# Patient Record
Sex: FEMALE | Race: WHITE
[De-identification: names, ages, dates, MRNs, and addresses within clinical notes are randomized per-mention and may not be internally consistent; named-entity substitution may affect disease eponyms.]

---

## 2017-01-17 ENCOUNTER — ROUTINE PRENATAL (OUTPATIENT)
Dept: OBGYN | Facility: CLINIC | Age: 27
End: 2017-01-17

## 2017-01-17 VITALS
WEIGHT: 153 LBS | HEART RATE: 90 BPM | BODY MASS INDEX: 27.98 KG/M2 | SYSTOLIC BLOOD PRESSURE: 127 MMHG | DIASTOLIC BLOOD PRESSURE: 72 MMHG

## 2017-01-17 DIAGNOSIS — Z23 NEED FOR PROPHYLACTIC VACCINATION WITH DIPHTHERIA-TETANUS-PERTUSSIS WITH POLIOMYELITIS (DTP + POLIO) VACCINE: ICD-10-CM

## 2017-01-17 DIAGNOSIS — Z34.83 PRENATAL CARE, SUBSEQUENT PREGNANCY, THIRD TRIMESTER: Primary | ICD-10-CM

## 2017-01-17 DIAGNOSIS — Z3A.27 27 WEEKS GESTATION OF PREGNANCY: ICD-10-CM

## 2017-01-17 PROCEDURE — 0502F SUBSEQUENT PRENATAL CARE: CPT | Performed by: SPECIALIST

## 2017-01-17 PROCEDURE — 90471 IMMUNIZATION ADMIN: CPT | Performed by: SPECIALIST

## 2017-01-17 PROCEDURE — 90715 TDAP VACCINE 7 YRS/> IM: CPT | Performed by: SPECIALIST

## 2017-01-31 ENCOUNTER — ROUTINE PRENATAL (OUTPATIENT)
Dept: OBGYN | Facility: CLINIC | Age: 27
End: 2017-01-31

## 2017-01-31 VITALS
SYSTOLIC BLOOD PRESSURE: 120 MMHG | WEIGHT: 149 LBS | BODY MASS INDEX: 27.25 KG/M2 | HEART RATE: 91 BPM | DIASTOLIC BLOOD PRESSURE: 76 MMHG

## 2017-01-31 DIAGNOSIS — Z3A.29 29 WEEKS GESTATION OF PREGNANCY: Primary | ICD-10-CM

## 2017-01-31 PROCEDURE — 0502F SUBSEQUENT PRENATAL CARE: CPT | Performed by: SPECIALIST

## 2017-02-21 ENCOUNTER — ROUTINE PRENATAL (OUTPATIENT)
Dept: OBGYN | Facility: CLINIC | Age: 27
End: 2017-02-21

## 2017-02-21 VITALS
WEIGHT: 151 LBS | DIASTOLIC BLOOD PRESSURE: 72 MMHG | BODY MASS INDEX: 27.62 KG/M2 | SYSTOLIC BLOOD PRESSURE: 122 MMHG | HEART RATE: 88 BPM

## 2017-02-21 DIAGNOSIS — Z34.83 PRENATAL CARE, SUBSEQUENT PREGNANCY, THIRD TRIMESTER: ICD-10-CM

## 2017-02-21 DIAGNOSIS — Z3A.32 32 WEEKS GESTATION OF PREGNANCY: Primary | ICD-10-CM

## 2017-02-21 PROCEDURE — 0502F SUBSEQUENT PRENATAL CARE: CPT | Performed by: SPECIALIST

## 2017-03-07 ENCOUNTER — ROUTINE PRENATAL (OUTPATIENT)
Dept: OBGYN | Facility: CLINIC | Age: 27
End: 2017-03-07

## 2017-03-07 VITALS
HEART RATE: 91 BPM | BODY MASS INDEX: 28.35 KG/M2 | DIASTOLIC BLOOD PRESSURE: 75 MMHG | WEIGHT: 155 LBS | SYSTOLIC BLOOD PRESSURE: 122 MMHG

## 2017-03-07 DIAGNOSIS — Z3A.35 35 WEEKS GESTATION OF PREGNANCY: ICD-10-CM

## 2017-03-07 DIAGNOSIS — Z34.83 PRENATAL CARE, SUBSEQUENT PREGNANCY, THIRD TRIMESTER: Primary | ICD-10-CM

## 2017-03-07 PROCEDURE — 0502F SUBSEQUENT PRENATAL CARE: CPT | Performed by: SPECIALIST

## 2017-03-14 ENCOUNTER — ROUTINE PRENATAL (OUTPATIENT)
Dept: OBGYN CLINIC | Age: 27
End: 2017-03-14

## 2017-03-14 VITALS
BODY MASS INDEX: 28.53 KG/M2 | DIASTOLIC BLOOD PRESSURE: 79 MMHG | SYSTOLIC BLOOD PRESSURE: 133 MMHG | HEART RATE: 99 BPM | WEIGHT: 156 LBS

## 2017-03-14 DIAGNOSIS — Z3A.36 36 WEEKS GESTATION OF PREGNANCY: ICD-10-CM

## 2017-03-14 DIAGNOSIS — Z34.83 PRENATAL CARE, SUBSEQUENT PREGNANCY, THIRD TRIMESTER: Primary | ICD-10-CM

## 2017-03-14 PROCEDURE — 0502F SUBSEQUENT PRENATAL CARE: CPT | Performed by: SPECIALIST

## 2017-03-20 ENCOUNTER — PROCEDURE VISIT (OUTPATIENT)
Dept: OBGYN CLINIC | Age: 27
End: 2017-03-20

## 2017-03-20 DIAGNOSIS — Z34.83 PRENATAL CARE, SUBSEQUENT PREGNANCY, THIRD TRIMESTER: ICD-10-CM

## 2017-03-20 DIAGNOSIS — Z3A.36 36 WEEKS GESTATION OF PREGNANCY: ICD-10-CM

## 2017-03-20 LAB
ABDOMINAL CIRCUMFERENCE: NORMAL CM
BIPARIETAL DIAMETER: NORMAL CM
ESTIMATED FETAL WEIGHT: NORMAL GRAMS
FEMORAL DIAMETER: NORMAL CM
FEMORAL LENGTH: NORMAL CM
HC/AC: NORMAL
HEAD CIRCUMFERENCE: NORMAL CM

## 2017-03-21 ENCOUNTER — ROUTINE PRENATAL (OUTPATIENT)
Dept: OBGYN CLINIC | Age: 27
End: 2017-03-21
Payer: COMMERCIAL

## 2017-03-21 ENCOUNTER — HOSPITAL ENCOUNTER (OUTPATIENT)
Age: 27
Setting detail: SPECIMEN
Discharge: HOME | End: 2017-03-21
Payer: COMMERCIAL

## 2017-03-21 VITALS
BODY MASS INDEX: 28.53 KG/M2 | WEIGHT: 156 LBS | SYSTOLIC BLOOD PRESSURE: 133 MMHG | HEART RATE: 89 BPM | DIASTOLIC BLOOD PRESSURE: 84 MMHG

## 2017-03-21 DIAGNOSIS — Z3A.36 36 WEEKS GESTATION OF PREGNANCY: ICD-10-CM

## 2017-03-21 DIAGNOSIS — Z34.83 PRENATAL CARE, SUBSEQUENT PREGNANCY, THIRD TRIMESTER: Primary | ICD-10-CM

## 2017-03-21 PROCEDURE — 99213 OFFICE O/P EST LOW 20 MIN: CPT | Performed by: SPECIALIST

## 2017-03-21 RX ORDER — VALACYCLOVIR HYDROCHLORIDE 500 MG/1
500 TABLET, FILM COATED ORAL 2 TIMES DAILY
Qty: 30 TABLET | Refills: 1 | Status: SHIPPED | OUTPATIENT
Start: 2017-03-21 | End: 2017-10-09 | Stop reason: SDUPTHER

## 2017-03-24 LAB
CULTURE: NORMAL
CULTURE: NORMAL
Lab: NORMAL
SPECIMEN DESCRIPTION: NORMAL
STATUS: NORMAL

## 2017-03-28 ENCOUNTER — ROUTINE PRENATAL (OUTPATIENT)
Dept: OBGYN CLINIC | Age: 27
End: 2017-03-28

## 2017-03-28 VITALS
DIASTOLIC BLOOD PRESSURE: 84 MMHG | WEIGHT: 159 LBS | HEART RATE: 88 BPM | SYSTOLIC BLOOD PRESSURE: 122 MMHG | BODY MASS INDEX: 29.08 KG/M2

## 2017-03-28 DIAGNOSIS — Z34.83 PRENATAL CARE, SUBSEQUENT PREGNANCY, THIRD TRIMESTER: Primary | ICD-10-CM

## 2017-03-28 PROCEDURE — 0502F SUBSEQUENT PRENATAL CARE: CPT | Performed by: SPECIALIST

## 2017-03-29 ENCOUNTER — TELEPHONE (OUTPATIENT)
Dept: OBGYN CLINIC | Age: 27
End: 2017-03-29

## 2017-03-30 ENCOUNTER — TELEPHONE (OUTPATIENT)
Dept: OBGYN CLINIC | Age: 27
End: 2017-03-30

## 2017-04-11 ENCOUNTER — ROUTINE PRENATAL (OUTPATIENT)
Dept: OBGYN CLINIC | Age: 27
End: 2017-04-11

## 2017-04-11 VITALS
SYSTOLIC BLOOD PRESSURE: 125 MMHG | HEART RATE: 84 BPM | WEIGHT: 159 LBS | BODY MASS INDEX: 29.08 KG/M2 | DIASTOLIC BLOOD PRESSURE: 82 MMHG

## 2017-04-11 DIAGNOSIS — Z3A.39 39 WEEKS GESTATION OF PREGNANCY: ICD-10-CM

## 2017-04-11 DIAGNOSIS — Z34.83 PRENATAL CARE, SUBSEQUENT PREGNANCY, THIRD TRIMESTER: Primary | ICD-10-CM

## 2017-04-11 PROCEDURE — 0502F SUBSEQUENT PRENATAL CARE: CPT | Performed by: SPECIALIST

## 2017-04-24 ENCOUNTER — OFFICE VISIT (OUTPATIENT)
Dept: OBGYN CLINIC | Age: 27
End: 2017-04-24
Payer: COMMERCIAL

## 2017-04-24 ENCOUNTER — POSTPARTUM VISIT (OUTPATIENT)
Dept: OBGYN CLINIC | Age: 27
End: 2017-04-24

## 2017-04-24 VITALS
SYSTOLIC BLOOD PRESSURE: 124 MMHG | WEIGHT: 142 LBS | DIASTOLIC BLOOD PRESSURE: 73 MMHG | BODY MASS INDEX: 25.16 KG/M2 | HEART RATE: 86 BPM | RESPIRATION RATE: 18 BRPM | HEIGHT: 63 IN

## 2017-04-24 DIAGNOSIS — R10.2 PELVIC PAIN IN FEMALE: Primary | ICD-10-CM

## 2017-04-24 DIAGNOSIS — R52 POSTPARTUM PAIN: ICD-10-CM

## 2017-04-24 PROCEDURE — 76856 US EXAM PELVIC COMPLETE: CPT | Performed by: SPECIALIST

## 2017-04-24 PROCEDURE — 99024 POSTOP FOLLOW-UP VISIT: CPT | Performed by: SPECIALIST

## 2017-04-24 RX ORDER — IBUPROFEN 800 MG/1
800 TABLET ORAL EVERY 8 HOURS
COMMUNITY
Start: 2017-04-16 | End: 2021-11-08 | Stop reason: ALTCHOICE

## 2017-04-24 ASSESSMENT — ENCOUNTER SYMPTOMS
VOMITING: 0
CONSTIPATION: 0
EYE PAIN: 0
ABDOMINAL PAIN: 0
ABDOMINAL DISTENTION: 0
COUGH: 0
NAUSEA: 0
APNEA: 0
DIARRHEA: 0

## 2017-05-23 ENCOUNTER — POSTPARTUM VISIT (OUTPATIENT)
Dept: OBGYN CLINIC | Age: 27
End: 2017-05-23

## 2017-05-23 VITALS
SYSTOLIC BLOOD PRESSURE: 121 MMHG | DIASTOLIC BLOOD PRESSURE: 75 MMHG | WEIGHT: 140 LBS | RESPIRATION RATE: 16 BRPM | BODY MASS INDEX: 24.8 KG/M2 | HEART RATE: 79 BPM

## 2017-05-23 PROCEDURE — 0503F POSTPARTUM CARE VISIT: CPT | Performed by: SPECIALIST

## 2017-05-23 ASSESSMENT — ENCOUNTER SYMPTOMS
APNEA: 0
DIARRHEA: 0
COUGH: 0
ABDOMINAL PAIN: 0
ABDOMINAL DISTENTION: 0
CONSTIPATION: 0
NAUSEA: 0
EYE PAIN: 0
VOMITING: 0

## 2017-10-09 RX ORDER — VALACYCLOVIR HYDROCHLORIDE 500 MG/1
TABLET, FILM COATED ORAL
Qty: 30 TABLET | Refills: 1 | Status: SHIPPED | OUTPATIENT
Start: 2017-10-09

## 2019-01-30 ENCOUNTER — HOSPITAL ENCOUNTER (OUTPATIENT)
Age: 29
Setting detail: SPECIMEN
Discharge: HOME | End: 2019-01-30
Payer: COMMERCIAL

## 2019-01-30 ENCOUNTER — OFFICE VISIT (OUTPATIENT)
Dept: OBGYN CLINIC | Age: 29
End: 2019-01-30
Payer: COMMERCIAL

## 2019-01-30 VITALS
WEIGHT: 143 LBS | HEART RATE: 82 BPM | SYSTOLIC BLOOD PRESSURE: 129 MMHG | HEIGHT: 63 IN | BODY MASS INDEX: 25.34 KG/M2 | DIASTOLIC BLOOD PRESSURE: 78 MMHG

## 2019-01-30 DIAGNOSIS — N76.0 ACUTE VAGINITIS: ICD-10-CM

## 2019-01-30 DIAGNOSIS — R10.2 PELVIC PAIN IN FEMALE: ICD-10-CM

## 2019-01-30 DIAGNOSIS — N94.10 DYSPAREUNIA, FEMALE: ICD-10-CM

## 2019-01-30 DIAGNOSIS — Z01.419 WELL WOMAN EXAM: Primary | ICD-10-CM

## 2019-01-30 PROCEDURE — 99395 PREV VISIT EST AGE 18-39: CPT | Performed by: SPECIALIST

## 2019-01-30 PROCEDURE — G8484 FLU IMMUNIZE NO ADMIN: HCPCS | Performed by: SPECIALIST

## 2019-01-30 RX ORDER — METRONIDAZOLE 500 MG/1
500 TABLET ORAL 2 TIMES DAILY
Qty: 14 TABLET | Refills: 0 | Status: SHIPPED | OUTPATIENT
Start: 2019-01-30 | End: 2019-02-06

## 2019-01-30 RX ORDER — FLUCONAZOLE 100 MG/1
100 TABLET ORAL DAILY
Qty: 7 TABLET | Refills: 0 | Status: SHIPPED | OUTPATIENT
Start: 2019-01-30 | End: 2019-02-06

## 2019-01-30 ASSESSMENT — ENCOUNTER SYMPTOMS
APNEA: 0
CONSTIPATION: 0
ABDOMINAL DISTENTION: 0
EYE PAIN: 0
COUGH: 0
ABDOMINAL PAIN: 0
NAUSEA: 0
VOMITING: 0
DIARRHEA: 0

## 2019-02-20 ENCOUNTER — OFFICE VISIT (OUTPATIENT)
Dept: OBGYN CLINIC | Age: 29
End: 2019-02-20
Payer: COMMERCIAL

## 2019-02-20 DIAGNOSIS — Z01.419 WELL WOMAN EXAM: ICD-10-CM

## 2019-02-20 DIAGNOSIS — N94.10 DYSPAREUNIA, FEMALE: ICD-10-CM

## 2019-02-20 DIAGNOSIS — R10.2 PELVIC PAIN IN FEMALE: ICD-10-CM

## 2019-02-20 DIAGNOSIS — N76.0 ACUTE VAGINITIS: ICD-10-CM

## 2019-02-20 LAB — CYTOLOGY REPORT: NORMAL

## 2019-02-20 PROCEDURE — 76830 TRANSVAGINAL US NON-OB: CPT | Performed by: SPECIALIST

## 2019-02-20 PROCEDURE — 76856 US EXAM PELVIC COMPLETE: CPT | Performed by: SPECIALIST

## 2019-03-05 ENCOUNTER — OFFICE VISIT (OUTPATIENT)
Dept: OBGYN CLINIC | Age: 29
End: 2019-03-05
Payer: COMMERCIAL

## 2019-03-05 VITALS
DIASTOLIC BLOOD PRESSURE: 73 MMHG | BODY MASS INDEX: 25.69 KG/M2 | HEIGHT: 63 IN | SYSTOLIC BLOOD PRESSURE: 120 MMHG | WEIGHT: 145 LBS | HEART RATE: 86 BPM

## 2019-03-05 DIAGNOSIS — R10.2 PELVIC PAIN IN FEMALE: Primary | ICD-10-CM

## 2019-03-05 PROCEDURE — G8484 FLU IMMUNIZE NO ADMIN: HCPCS | Performed by: SPECIALIST

## 2019-03-05 PROCEDURE — G8419 CALC BMI OUT NRM PARAM NOF/U: HCPCS | Performed by: SPECIALIST

## 2019-03-05 PROCEDURE — G8427 DOCREV CUR MEDS BY ELIG CLIN: HCPCS | Performed by: SPECIALIST

## 2019-03-05 PROCEDURE — 4004F PT TOBACCO SCREEN RCVD TLK: CPT | Performed by: SPECIALIST

## 2019-03-05 PROCEDURE — 99212 OFFICE O/P EST SF 10 MIN: CPT | Performed by: SPECIALIST

## 2019-03-05 ASSESSMENT — ENCOUNTER SYMPTOMS
EYE PAIN: 0
APNEA: 0
ABDOMINAL PAIN: 0
NAUSEA: 0
COUGH: 0
DIARRHEA: 0
CONSTIPATION: 0
ABDOMINAL DISTENTION: 0
VOMITING: 0

## 2019-11-10 ENCOUNTER — HOSPITAL ENCOUNTER (EMERGENCY)
Age: 29
Discharge: HOME | End: 2019-11-10
Attending: EMERGENCY MEDICINE
Payer: COMMERCIAL

## 2019-11-10 VITALS
BODY MASS INDEX: 26.58 KG/M2 | RESPIRATION RATE: 14 BRPM | WEIGHT: 150 LBS | HEIGHT: 63 IN | DIASTOLIC BLOOD PRESSURE: 85 MMHG | OXYGEN SATURATION: 100 % | HEART RATE: 90 BPM | SYSTOLIC BLOOD PRESSURE: 128 MMHG

## 2019-11-10 DIAGNOSIS — R51.9 ACUTE NONINTRACTABLE HEADACHE, UNSPECIFIED HEADACHE TYPE: Primary | ICD-10-CM

## 2019-11-10 PROCEDURE — 6360000002 HC RX W HCPCS: Performed by: EMERGENCY MEDICINE

## 2019-11-10 PROCEDURE — 99283 EMERGENCY DEPT VISIT LOW MDM: CPT

## 2019-11-10 PROCEDURE — 96372 THER/PROPH/DIAG INJ SC/IM: CPT

## 2019-11-10 RX ORDER — KETOROLAC TROMETHAMINE 30 MG/ML
30 INJECTION, SOLUTION INTRAMUSCULAR; INTRAVENOUS ONCE
Status: COMPLETED | OUTPATIENT
Start: 2019-11-10 | End: 2019-11-10

## 2019-11-10 RX ADMIN — KETOROLAC TROMETHAMINE 30 MG: 30 INJECTION, SOLUTION INTRAMUSCULAR; INTRAVENOUS at 21:10

## 2019-11-10 ASSESSMENT — ENCOUNTER SYMPTOMS
CONSTIPATION: 0
SORE THROAT: 0
CHEST TIGHTNESS: 0
VOMITING: 0
EYE PAIN: 0
DIARRHEA: 0
WHEEZING: 0
ABDOMINAL PAIN: 0
SINUS PRESSURE: 0
NAUSEA: 0
BACK PAIN: 0
COLOR CHANGE: 0
EYE DISCHARGE: 0
TROUBLE SWALLOWING: 0
FACIAL SWELLING: 0
RHINORRHEA: 0
SHORTNESS OF BREATH: 0
COUGH: 0
BLOOD IN STOOL: 0
EYE REDNESS: 0

## 2019-11-10 ASSESSMENT — PAIN DESCRIPTION - LOCATION: LOCATION: HEAD

## 2019-11-10 ASSESSMENT — PAIN SCALES - GENERAL
PAINLEVEL_OUTOF10: 7
PAINLEVEL_OUTOF10: 0
PAINLEVEL_OUTOF10: 7

## 2019-11-10 ASSESSMENT — PAIN DESCRIPTION - PAIN TYPE: TYPE: ACUTE PAIN

## 2021-08-02 ENCOUNTER — HOSPITAL ENCOUNTER (OUTPATIENT)
Age: 31
Setting detail: SPECIMEN
Discharge: HOME | End: 2021-08-02
Payer: COMMERCIAL

## 2021-08-02 ENCOUNTER — OFFICE VISIT (OUTPATIENT)
Dept: OBGYN CLINIC | Age: 31
End: 2021-08-02
Payer: COMMERCIAL

## 2021-08-02 VITALS
HEIGHT: 63 IN | DIASTOLIC BLOOD PRESSURE: 55 MMHG | HEART RATE: 84 BPM | BODY MASS INDEX: 26.75 KG/M2 | SYSTOLIC BLOOD PRESSURE: 114 MMHG | WEIGHT: 151 LBS

## 2021-08-02 DIAGNOSIS — Z11.3 SCREEN FOR STD (SEXUALLY TRANSMITTED DISEASE): ICD-10-CM

## 2021-08-02 DIAGNOSIS — N94.10 DYSPAREUNIA IN FEMALE: Primary | ICD-10-CM

## 2021-08-02 LAB
DIRECT EXAM: ABNORMAL
Lab: ABNORMAL
SPECIMEN DESCRIPTION: ABNORMAL

## 2021-08-02 PROCEDURE — G8419 CALC BMI OUT NRM PARAM NOF/U: HCPCS | Performed by: CLINICAL NURSE SPECIALIST

## 2021-08-02 PROCEDURE — 4004F PT TOBACCO SCREEN RCVD TLK: CPT | Performed by: CLINICAL NURSE SPECIALIST

## 2021-08-02 PROCEDURE — 99213 OFFICE O/P EST LOW 20 MIN: CPT | Performed by: CLINICAL NURSE SPECIALIST

## 2021-08-02 PROCEDURE — G8427 DOCREV CUR MEDS BY ELIG CLIN: HCPCS | Performed by: CLINICAL NURSE SPECIALIST

## 2021-08-02 SDOH — ECONOMIC STABILITY: FOOD INSECURITY: WITHIN THE PAST 12 MONTHS, YOU WORRIED THAT YOUR FOOD WOULD RUN OUT BEFORE YOU GOT MONEY TO BUY MORE.: NEVER TRUE

## 2021-08-02 SDOH — ECONOMIC STABILITY: TRANSPORTATION INSECURITY
IN THE PAST 12 MONTHS, HAS LACK OF TRANSPORTATION KEPT YOU FROM MEETINGS, WORK, OR FROM GETTING THINGS NEEDED FOR DAILY LIVING?: NO

## 2021-08-02 SDOH — ECONOMIC STABILITY: FOOD INSECURITY: WITHIN THE PAST 12 MONTHS, THE FOOD YOU BOUGHT JUST DIDN'T LAST AND YOU DIDN'T HAVE MONEY TO GET MORE.: NEVER TRUE

## 2021-08-02 SDOH — ECONOMIC STABILITY: TRANSPORTATION INSECURITY
IN THE PAST 12 MONTHS, HAS THE LACK OF TRANSPORTATION KEPT YOU FROM MEDICAL APPOINTMENTS OR FROM GETTING MEDICATIONS?: NO

## 2021-08-02 ASSESSMENT — PATIENT HEALTH QUESTIONNAIRE - PHQ9
SUM OF ALL RESPONSES TO PHQ QUESTIONS 1-9: 0
1. LITTLE INTEREST OR PLEASURE IN DOING THINGS: 0
SUM OF ALL RESPONSES TO PHQ QUESTIONS 1-9: 0
SUM OF ALL RESPONSES TO PHQ QUESTIONS 1-9: 0
SUM OF ALL RESPONSES TO PHQ9 QUESTIONS 1 & 2: 0
2. FEELING DOWN, DEPRESSED OR HOPELESS: 0

## 2021-08-02 ASSESSMENT — ENCOUNTER SYMPTOMS
EYES NEGATIVE: 1
GASTROINTESTINAL NEGATIVE: 1
RESPIRATORY NEGATIVE: 1
ALLERGIC/IMMUNOLOGIC NEGATIVE: 1

## 2021-08-02 ASSESSMENT — SOCIAL DETERMINANTS OF HEALTH (SDOH): HOW HARD IS IT FOR YOU TO PAY FOR THE VERY BASICS LIKE FOOD, HOUSING, MEDICAL CARE, AND HEATING?: NOT HARD AT ALL

## 2021-08-02 NOTE — PROGRESS NOTES
Subjective:      Patient ID:  Zahra Gardner is a 35 y.o. female who presents for   Chief Complaint   Patient presents with    Other     Pt states she had intercourse with tampon inserted. HPI     Patient is a 36 yo female who presents for painful intercourse. Patient states that she had intercourse several weeks ago and was very drunk and did not realize she had a tampon in which stayed in for a couple of days but she has removed it. Patient states that a few days ago she had intercourse and it was painful and she became worried. Patient states that she thinks it may have been the position. Review of Systems   Constitutional: Negative for chills and fever. HENT: Negative. Eyes: Negative. Respiratory: Negative. Cardiovascular: Negative. Gastrointestinal: Negative. Endocrine: Negative. Genitourinary: Positive for dyspareunia (one time ). Negative for menstrual problem and vaginal discharge. Musculoskeletal: Negative. Skin: Negative. Allergic/Immunologic: Negative. Neurological: Negative. Hematological: Negative. Psychiatric/Behavioral: Negative. BP (!) 114/55 (Site: Right Upper Arm, Position: Sitting)   Pulse 84   Ht 5' 3\" (1.6 m)   Wt 151 lb (68.5 kg)   LMP 07/15/2021 (Approximate)   BMI 26.75 kg/m²    Patient's last menstrual period was 07/15/2021 (approximate).     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 2nd birth 2013      Past Surgical History:   Procedure Laterality Date    CHOLECYSTECTOMY        Social History     Socioeconomic History    Marital status: Single     Spouse name: None    Number of children: None    Years of education: None    Highest education level: None Occupational History    None   Tobacco Use    Smoking status: Current Every Day Smoker     Packs/day: 1.00     Start date: 1/27/2006    Smokeless tobacco: Never Used   Substance and Sexual Activity    Alcohol use: Yes     Alcohol/week: 0.0 standard drinks     Comment: Socially    Drug use: No    Sexual activity: Yes     Partners: Male   Other Topics Concern    None   Social History Narrative    None     Social Determinants of Health     Financial Resource Strain: Low Risk     Difficulty of Paying Living Expenses: Not hard at all   Food Insecurity: No Food Insecurity    Worried About Running Out of Food in the Last Year: Never true    [place] of Food in the Last Year: Never true   Transportation Needs: No Transportation Needs    Lack of Transportation (Medical): No    Lack of Transportation (Non-Medical):  No   Physical Activity:     Days of Exercise per Week:     Minutes of Exercise per Session:    Stress:     Feeling of Stress :    Social Connections:     Frequency of Communication with Friends and Family:     Frequency of Social Gatherings with Friends and Family:     Attends L[employer] Services:     Active Member of Clubs or Organizations:     Attends Club or Organization Meetings:     Marital Status:    Intimate Partner Violence:     Fear of Current or Ex-Partner:     Emotionally Abused:     Physically Abused:     Sexually Abused:       Current Outpatient Medications   Medication Sig Dispense Refill    sertraline (ZOLOFT) 50 MG tablet TAKE 1 TABLET BY MOUTH EVERY DAY 90 tablet 0    butalbital-acetaminophen-caffeine (FIORICET, ESGIC) -40 MG per tablet Take 1 tablet by mouth every 4 hours as needed for Headaches 90 tablet 0    ibuprofen (ADVIL;MOTRIN) 800 MG tablet Take 800 mg by mouth every 8 hours      valACYclovir (VALTREX) 500 MG tablet take 1 tablet by mouth twice a day (Patient not taking: Reported on 7/6/2020) 30 tablet 1     No current facility-administered medications

## 2021-08-03 ENCOUNTER — TELEPHONE (OUTPATIENT)
Dept: OBGYN CLINIC | Age: 31
End: 2021-08-03

## 2021-08-03 DIAGNOSIS — B96.89 BV (BACTERIAL VAGINOSIS): Primary | ICD-10-CM

## 2021-08-03 DIAGNOSIS — N76.0 BV (BACTERIAL VAGINOSIS): Primary | ICD-10-CM

## 2021-08-03 RX ORDER — METRONIDAZOLE 500 MG/1
500 TABLET ORAL 2 TIMES DAILY
Qty: 14 TABLET | Refills: 0 | Status: SHIPPED | OUTPATIENT
Start: 2021-08-03 | End: 2021-08-10

## 2021-08-03 RX ORDER — FLUCONAZOLE 100 MG/1
100 TABLET ORAL DAILY
Qty: 7 TABLET | Refills: 0 | Status: SHIPPED | OUTPATIENT
Start: 2021-08-03 | End: 2021-08-10

## 2021-08-03 NOTE — TELEPHONE ENCOUNTER
----- Message from IRENE Bedolla - CNP sent at 8/3/2021  8:11 AM EDT -----  Please let the patient know that she has bv and this could have been the cause of the painful intercourse, I sent flagyl and diflucan

## 2021-11-09 ENCOUNTER — TELEPHONE (OUTPATIENT)
Dept: OBGYN CLINIC | Age: 31
End: 2021-11-09

## 2021-11-09 NOTE — TELEPHONE ENCOUNTER
She needs a letter stating she had a blood clot in 2013 after having her son. She wants the letter to be exempt from having the Covid shot. She is scared with her history of blood clots, she could get another clot from the shot. Please advise.

## 2022-05-05 PROBLEM — G89.29 CHRONIC NONINTRACTABLE HEADACHE: Status: ACTIVE | Noted: 2022-05-05

## 2022-05-05 PROBLEM — M79.18 MYOFASCIAL MUSCLE PAIN: Status: ACTIVE | Noted: 2022-05-05

## 2022-05-05 PROBLEM — R51.9 CHRONIC NONINTRACTABLE HEADACHE: Status: ACTIVE | Noted: 2022-05-05

## 2022-05-05 PROBLEM — F90.2 ADHD (ATTENTION DEFICIT HYPERACTIVITY DISORDER), COMBINED TYPE: Status: ACTIVE | Noted: 2022-05-05

## 2022-05-05 PROBLEM — F41.9 ANXIETY: Status: ACTIVE | Noted: 2022-05-05

## 2022-05-05 PROBLEM — F32.A ANXIETY AND DEPRESSION: Status: ACTIVE | Noted: 2022-05-05

## 2022-05-05 PROBLEM — F41.8 ANXIETY ASSOCIATED WITH DEPRESSION: Status: ACTIVE | Noted: 2022-05-05

## 2022-10-17 ENCOUNTER — HOSPITAL ENCOUNTER (OUTPATIENT)
Age: 32
Setting detail: SPECIMEN
Discharge: HOME | End: 2022-10-17

## 2022-10-17 ENCOUNTER — PROCEDURE VISIT (OUTPATIENT)
Dept: OBGYN CLINIC | Age: 32
End: 2022-10-17
Payer: COMMERCIAL

## 2022-10-17 VITALS
HEART RATE: 97 BPM | HEIGHT: 63 IN | BODY MASS INDEX: 27.82 KG/M2 | DIASTOLIC BLOOD PRESSURE: 72 MMHG | SYSTOLIC BLOOD PRESSURE: 118 MMHG | WEIGHT: 157 LBS

## 2022-10-17 DIAGNOSIS — Z30.432 ENCOUNTER FOR IUD REMOVAL: ICD-10-CM

## 2022-10-17 DIAGNOSIS — Z11.51 SCREENING FOR HPV (HUMAN PAPILLOMAVIRUS): ICD-10-CM

## 2022-10-17 DIAGNOSIS — Z01.419 WELL WOMAN EXAM: Primary | ICD-10-CM

## 2022-10-17 PROCEDURE — 58301 REMOVE INTRAUTERINE DEVICE: CPT | Performed by: SPECIALIST

## 2022-10-17 PROCEDURE — G8484 FLU IMMUNIZE NO ADMIN: HCPCS | Performed by: SPECIALIST

## 2022-10-17 PROCEDURE — 99395 PREV VISIT EST AGE 18-39: CPT | Performed by: SPECIALIST

## 2022-10-17 ASSESSMENT — ENCOUNTER SYMPTOMS
VOMITING: 0
ABDOMINAL DISTENTION: 0
EYE PAIN: 0
NAUSEA: 0
APNEA: 0
ABDOMINAL PAIN: 0
CONSTIPATION: 0
COUGH: 0
DIARRHEA: 0

## 2022-10-17 NOTE — PROGRESS NOTES
Subjective:      Patient ID: Loretta Sanford is a 28 y.o. female. Chief Complaint   Patient presents with    Annual Exam     /72   Pulse 97   Ht 5' 3\" (1.6 m)   Wt 157 lb (71.2 kg)   LMP 09/30/2022 (Approximate)   BMI 27.81 kg/m²   Patient's last menstrual period was 09/30/2022 (approximate). 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 30 capsule 2    butalbital-acetaminophen-caffeine (FIORICET, ESGIC) -40 MG per tablet Take 1 tablet by mouth every 4 hours as needed for Headaches 90 tablet 0    valACYclovir (VALTREX) 500 MG tablet take 1 tablet by mouth twice a day (Patient not taking: No sig reported) 30 tablet 1     No current Epic-ordered facility-administered medications on file. Problem List Items Addressed This Visit       Encounter for IUD removal - Primary    Relevant Orders    KS REMOVE INTRAUTERINE DEVICE (Completed)     No Known Allergies  Orders Placed This Encounter   Procedures    PAP SMEAR     Patient History:    No LMP recorded. OBGYN Status: Having periods  Past Surgical History:  No date: CHOLECYSTECTOMY      Social History    Tobacco Use      Smoking status: Every Day        Packs/day: 1.00        Types: Cigarettes        Start date: 1/27/2006      Smokeless tobacco: Never       Standing Status:   Future     Standing Expiration Date:   10/17/2023     Order Specific Question:   Collection Type     Answer:    Thin Prep     Order Specific Question:   Prior Abnormal Pap Test     Answer:   No     Order Specific Question:   Screening or Diagnostic     Answer:   Screening     Order Specific Question:   HPV Requested? Answer:   Yes     Order Specific Question:   High Risk Patient     Answer:   N/A    IA REMOVE INTRAUTERINE DEVICE            HPI  Patient is here for an annual exam today. Her last pap smear was 1/30/2019 and was negative for intraepithelial lesion or malignancy. She had an IUD inserted in 2017 and would like to have that removed today. She does not do monthly breast exams. Review of Systems   Constitutional:  Negative for activity change, appetite change and fever. HENT:  Negative for ear discharge and ear pain. Eyes:  Negative for pain and visual disturbance. Respiratory:  Negative for apnea and cough. Cardiovascular:  Negative for chest pain, palpitations and leg swelling. Gastrointestinal:  Negative for abdominal distention, abdominal pain, constipation, diarrhea, nausea and vomiting. Endocrine: Negative. Genitourinary:  Negative for difficulty urinating, dysuria, menstrual problem and pelvic pain. Musculoskeletal:  Negative for neck pain and neck stiffness. Skin: Negative. Neurological:  Negative for light-headedness and numbness. Hematological: Negative. Does not bruise/bleed easily. Objective:   Physical Exam  Vitals and nursing note reviewed. Exam conducted with a chaperone present. Constitutional:       Appearance: She is well-developed. HENT:      Head: Normocephalic and atraumatic. Neck:      Thyroid: No thyroid mass or thyromegaly. Cardiovascular:      Rate and Rhythm: Normal rate and regular rhythm. Pulmonary:      Effort: Pulmonary effort is normal.      Breath sounds: Normal breath sounds. Chest:   Breasts:     Right: Normal. No inverted nipple, mass, nipple discharge, skin change or tenderness. Left: Normal. No inverted nipple, mass, nipple discharge, skin change or tenderness.    Abdominal:      General: Bowel sounds are normal. There is no distension. Palpations: Abdomen is soft. There is no mass. Tenderness: There is no abdominal tenderness. There is no guarding or rebound. Hernia: There is no hernia in the left inguinal area. Genitourinary:     General: Normal vulva. Exam position: Supine. Labia:         Right: No rash or lesion. Left: No rash or lesion. Vagina: Foreign body (IUD strings visualized) present. No signs of injury. No vaginal discharge or tenderness. Cervix: No cervical motion tenderness or discharge. Uterus: Normal. Not enlarged, not fixed and not tender. Adnexa: Right adnexa normal and left adnexa normal.        Right: No mass or tenderness. Left: No mass or tenderness. Rectum: Normal. No mass or anal fissure. Normal anal tone. Musculoskeletal:         General: No tenderness. Normal range of motion. Skin:     General: Skin is warm and dry. Neurological:      Mental Status: She is alert and oriented to person, place, and time. Psychiatric:         Judgment: Judgment normal.       Assessment:      Patient with normal annual exam.  Pap smear was done. Encouraged patient to perform monthly self breast awareness exams. IUD strings visualized. Plan:      Orders Placed This Encounter   Procedures    PAP SMEAR    KS REMOVE INTRAUTERINE DEVICE       IUD strings visualized and removed without difficulty using a ring forceps. Minimal pain produced. Appointment in 1 year    ISaira, am scribing for, and in the presence of Dr. Noé Jay. Electronically signed by: Saira Esteban 10/17/22 10:19 AM         I agree to the above documentation placed by my scribe Saira Esteban. I reviewed the scribe's note and agree with the documented findings and plan of care. Any areas of disagreement are noted on the chart. I have personally evaluated this patient. Additional findings are as noted.  I agree with the chief complaint, past medical history, past surgical history, allergies, medications, social and family history as documented unless otherwise noted below.      Electronically signed by Shasta Vega MD on 10/17/2022 at 2:25 PM

## 2022-10-20 LAB
HPV SAMPLE: NORMAL
HPV, GENOTYPE 16: NOT DETECTED
HPV, GENOTYPE 18: NOT DETECTED
HPV, HIGH RISK OTHER: NOT DETECTED
HPV, INTERPRETATION: NORMAL
SPECIMEN DESCRIPTION: NORMAL

## 2022-10-21 LAB — CYTOLOGY REPORT: NORMAL

## 2023-03-28 ENCOUNTER — TELEPHONE (OUTPATIENT)
Dept: OBGYN CLINIC | Age: 33
End: 2023-03-28

## 2023-03-28 NOTE — TELEPHONE ENCOUNTER
Pt calling because she just found out shes pregnant and was prescribed Strattera by her PCP  Per Dr Parsons Crimes pt should discontinue taking this medication

## 2023-04-19 DIAGNOSIS — O36.80X0 PREGNANCY WITH INCONCLUSIVE FETAL VIABILITY, SINGLE OR UNSPECIFIED FETUS: Primary | ICD-10-CM

## 2023-05-11 ENCOUNTER — HOSPITAL ENCOUNTER (OUTPATIENT)
Age: 33
Setting detail: SPECIMEN
Discharge: HOME | End: 2023-05-11

## 2023-05-11 ENCOUNTER — INITIAL PRENATAL (OUTPATIENT)
Dept: OBGYN CLINIC | Age: 33
End: 2023-05-11
Payer: COMMERCIAL

## 2023-05-11 VITALS
DIASTOLIC BLOOD PRESSURE: 70 MMHG | WEIGHT: 167 LBS | HEART RATE: 88 BPM | BODY MASS INDEX: 29.58 KG/M2 | SYSTOLIC BLOOD PRESSURE: 110 MMHG

## 2023-05-11 DIAGNOSIS — Z3A.10 10 WEEKS GESTATION OF PREGNANCY: ICD-10-CM

## 2023-05-11 DIAGNOSIS — A60.00 GENITAL HERPES SIMPLEX, UNSPECIFIED SITE: ICD-10-CM

## 2023-05-11 DIAGNOSIS — F90.2 ADHD (ATTENTION DEFICIT HYPERACTIVITY DISORDER), COMBINED TYPE: ICD-10-CM

## 2023-05-11 DIAGNOSIS — O09.91 HRP (HIGH RISK PREGNANCY), FIRST TRIMESTER: ICD-10-CM

## 2023-05-11 DIAGNOSIS — O09.91 HRP (HIGH RISK PREGNANCY), FIRST TRIMESTER: Primary | ICD-10-CM

## 2023-05-11 PROCEDURE — G8427 DOCREV CUR MEDS BY ELIG CLIN: HCPCS | Performed by: CLINICAL NURSE SPECIALIST

## 2023-05-11 PROCEDURE — G8419 CALC BMI OUT NRM PARAM NOF/U: HCPCS | Performed by: CLINICAL NURSE SPECIALIST

## 2023-05-11 PROCEDURE — 1036F TOBACCO NON-USER: CPT | Performed by: CLINICAL NURSE SPECIALIST

## 2023-05-11 PROCEDURE — 99215 OFFICE O/P EST HI 40 MIN: CPT | Performed by: CLINICAL NURSE SPECIALIST

## 2023-05-11 NOTE — PROGRESS NOTES
Buddy Govea is a 33 y.o. female 10w4d        OB History    Para Term  AB Living   4 3 2     3   SAB IAB Ectopic Molar Multiple Live Births             3      # Outcome Date GA Lbr Jose/2nd Weight Sex Delivery Anes PTL Lv   4 Current            3 Term 17 40w1d   M Vag-Spont   CHRISTINA   2 Term 13 39w0d  8 lb 2 oz (3.685 kg) M Vag-Spont EPI N CHRISTINA      Birth Comments: pt had ovarian dvt after delivery    1 Para 11 39w0d  7 lb 2 oz (3.232 kg) M Vag-Spont EPI N CHRISTINA       Blood pressure 110/70, pulse 88, weight 167 lb (75.8 kg), last menstrual period 2023, currently breastfeeding. The patient was seen and evaluated. There was N/A fetal movements. No contractions or leakage of fluid. Signs and symptoms of  labor as well as labor were reviewed. A Quad Screen was ordered for a 15-20 week gestational age window. Dates were reviewed with the patient. An 18-22 week anatomy ultrasound has been ordered. The patient will return to the office for her next visit in 4 weeks. See antepartum flow sheet. Patient Active Problem List    Diagnosis Date Noted    Encounter for IUD removal 10/17/2022     Priority: Medium    Anxiety associated with depression 2022     Priority: Medium    Myofascial muscle pain 2022     Priority: Medium    ADHD (attention deficit hyperactivity disorder), combined type 2022     Priority: Medium    Adult BMI 27.0-27.9 kg/sq m 2022     Priority: Medium    Chronic nonintractable headache 2022     Priority: Medium        Diagnosis Orders   1.  HRP (high risk pregnancy), first trimester  Prenatal type and screen    HIV Screen    Urinalysis with Reflex to Culture    C.trachomatis N.gonorrhoeae DNA    Glucose tolerance, 1 hour    Vaginitis DNA Probe    Prenatal Profile 1    TSH with Reflex    Ambulatory referral to Maternal Fetal      2. 10 weeks gestation of pregnancy  Prenatal type and screen    HIV Screen

## 2023-05-12 LAB
ABO/RH: NORMAL
ABSOLUTE EOS #: 0.11 K/UL (ref 0–0.44)
ABSOLUTE IMMATURE GRANULOCYTE: 0.03 K/UL (ref 0–0.3)
ABSOLUTE LYMPH #: 2.01 K/UL (ref 1.1–3.7)
ABSOLUTE MONO #: 0.45 K/UL (ref 0.1–1.2)
AMORPHOUS: ABNORMAL
ANTIBODY SCREEN: NEGATIVE
BASOPHILS # BLD: 0 % (ref 0–2)
BASOPHILS ABSOLUTE: 0.04 K/UL (ref 0–0.2)
BILIRUBIN URINE: NEGATIVE
C TRACH DNA SPEC QL PROBE+SIG AMP: NEGATIVE
CANDIDA SPECIES, DNA PROBE: NEGATIVE
COLOR: YELLOW
EOSINOPHILS RELATIVE PERCENT: 1 % (ref 1–4)
EPITHELIAL CELLS UA: ABNORMAL /HPF (ref 0–5)
GARDNERELLA VAGINALIS, DNA PROBE: NEGATIVE
GLUCOSE ADMINISTRATION: NORMAL
GLUCOSE TOLERANCE SCREEN 50G: 93 MG/DL (ref 70–135)
GLUCOSE UR STRIP.AUTO-MCNC: NEGATIVE MG/DL
HBV SURFACE AG SER QL: NONREACTIVE
HCT VFR BLD AUTO: 39.2 % (ref 36.3–47.1)
HGB BLD-MCNC: 13.1 G/DL (ref 11.9–15.1)
HIV 1+2 AB+HIV1 P24 AG SERPL QL IA: NONREACTIVE
IMMATURE GRANULOCYTES: 0 %
KETONES UR STRIP.AUTO-MCNC: NEGATIVE MG/DL
LEUKOCYTE ESTERASE UR QL STRIP.AUTO: NEGATIVE
LYMPHOCYTES # BLD: 20 % (ref 24–43)
MCH RBC QN AUTO: 31.7 PG (ref 25.2–33.5)
MCHC RBC AUTO-ENTMCNC: 33.4 G/DL (ref 28.4–34.8)
MCV RBC AUTO: 94.9 FL (ref 82.6–102.9)
MONOCYTES # BLD: 5 % (ref 3–12)
N GONORRHOEA DNA SPEC QL PROBE+SIG AMP: NEGATIVE
NITRITE UR QL STRIP.AUTO: NEGATIVE
NRBC AUTOMATED: 0 PER 100 WBC
PDW BLD-RTO: 13.2 % (ref 11.8–14.4)
PLATELET # BLD AUTO: 271 K/UL (ref 138–453)
PMV BLD AUTO: 10.7 FL (ref 8.1–13.5)
PROT UR STRIP.AUTO-MCNC: 7 MG/DL (ref 5–8)
PROT UR STRIP.AUTO-MCNC: NEGATIVE MG/DL
RBC # BLD: 4.13 M/UL (ref 3.95–5.11)
RBC CLUMPS #/AREA URNS AUTO: ABNORMAL /HPF (ref 0–2)
RUBV IGG SER QL: 366.9 IU/ML
SEG NEUTROPHILS: 74 % (ref 36–65)
SEGMENTED NEUTROPHILS ABSOLUTE COUNT: 7.43 K/UL (ref 1.5–8.1)
SOURCE: NORMAL
SPECIFIC GRAVITY UA: 1.02 (ref 1–1.03)
SPECIMEN DESCRIPTION: NORMAL
T PALLIDUM AB SER QL IA: NONREACTIVE
TRICHOMONAS VAGINALIS DNA: NEGATIVE
TSH SERPL-ACNC: 0.78 UIU/ML (ref 0.3–5)
TURBIDITY: ABNORMAL
URINE HGB: NEGATIVE
UROBILINOGEN, URINE: NORMAL
WBC # BLD AUTO: 10.1 K/UL (ref 3.5–11.3)
WBC UA: ABNORMAL /HPF (ref 0–5)

## 2023-05-22 ENCOUNTER — ROUTINE PRENATAL (OUTPATIENT)
Dept: PERINATAL CARE | Age: 33
End: 2023-05-22
Payer: COMMERCIAL

## 2023-05-22 VITALS
RESPIRATION RATE: 16 BRPM | TEMPERATURE: 98 F | HEART RATE: 83 BPM | WEIGHT: 166 LBS | HEIGHT: 62 IN | BODY MASS INDEX: 30.55 KG/M2 | SYSTOLIC BLOOD PRESSURE: 119 MMHG | DIASTOLIC BLOOD PRESSURE: 69 MMHG

## 2023-05-22 DIAGNOSIS — Z3A.11 11 WEEKS GESTATION OF PREGNANCY: ICD-10-CM

## 2023-05-22 DIAGNOSIS — O36.80X0 ENCOUNTER TO DETERMINE FETAL VIABILITY OF PREGNANCY, SINGLE OR UNSPECIFIED FETUS: ICD-10-CM

## 2023-05-22 DIAGNOSIS — Z86.718 HISTORY OF DEEP VENOUS THROMBOSIS: ICD-10-CM

## 2023-05-22 DIAGNOSIS — O99.211 OBESITY AFFECTING PREGNANCY IN FIRST TRIMESTER: ICD-10-CM

## 2023-05-22 DIAGNOSIS — Z36.9 FIRST TRIMESTER SCREENING: Primary | ICD-10-CM

## 2023-05-22 DIAGNOSIS — O99.330 TOBACCO USE DISORDER COMPLICATING PREGNANCY, CHILDBIRTH, OR PUERPERIUM, ANTEPARTUM: ICD-10-CM

## 2023-05-22 DIAGNOSIS — O35.2XX0 HEREDITARY FAMILIAL DISEASE AFFECTING MANAGEMENT OF MOTHER AND POSSIBLY AFFECTING FETUS, ANTEPARTUM, SINGLE OR UNSPECIFIED FETUS: ICD-10-CM

## 2023-05-22 LAB
CRL: NORMAL
SAC DIAMETER: NORMAL

## 2023-05-22 PROCEDURE — 99999 PR OFFICE/OUTPT VISIT,PROCEDURE ONLY: CPT | Performed by: OBSTETRICS & GYNECOLOGY

## 2023-05-22 PROCEDURE — 76801 OB US < 14 WKS SINGLE FETUS: CPT | Performed by: OBSTETRICS & GYNECOLOGY

## 2023-05-22 PROCEDURE — 76813 OB US NUCHAL MEAS 1 GEST: CPT | Performed by: OBSTETRICS & GYNECOLOGY

## 2023-05-23 NOTE — PROGRESS NOTES
Advise early 1-hour glucola (if not already done) to evaluate for pregestational diabetes (first degree relative with diabetes). Options with respect to offering thrombophilia laboratory testing and management options, invasive genetic prenatal testing, non-invasive prenatal testing (NIPT/NIPS), and maternal carrier prenatal genetic testing could not be completed for the above maternal/fetal medical/obstetrical complications of pregnancy, as the patient declines a Maternal-Fetal Medicine (MFM) physician consultation today. Patient declines a Maternal-Fetal Medicine complete physician consultation today regarding the fetal and/or maternal medical/obstetrical complications/co-morbidities of pregnancy. Maternal-Fetal Medicine (MFM) attending physician will defer all management for these medical/obstetrical complications of pregnancy to the primary attending obstetrical physician/provider, as a result. Therefore, only an ultrasound evaluation was completed today in the MFM office.

## 2023-05-30 ENCOUNTER — HOSPITAL ENCOUNTER (OUTPATIENT)
Age: 33
Discharge: HOME | End: 2023-05-30
Payer: COMMERCIAL

## 2023-05-30 PROCEDURE — 81508 FTL CGEN ABNOR TWO PROTEINS: CPT

## 2023-05-30 PROCEDURE — 36415 COLL VENOUS BLD VENIPUNCTURE: CPT

## 2023-06-01 LAB
AFP INTERPRETATION: NORMAL
AFP SPECIMEN: NORMAL
CRL: 68.5 MM
CROWN RUMP LENGTH TWIN B: NORMAL MM
CROWN RUMP LENGTH: 68.5
DATE OF BIRTH: NORMAL
DONOR EGG?: NORMAL
GESTATIONAL AGE: NORMAL
HX OF HEREDITARY DISORDERS: NORMAL
IN VITRO FERTILIZATION: NORMAL
MATERNAL AGE AT EDD: 31.9 YR
MATERNAL SCREEN, EER: NORMAL
MATERNAL WEIGHT: 166
MOM FOR NT, TWIN B: NORMAL
MOM FOR NT: 0.77
MOM FOR PAPP-A: NORMAL
MONOCHORIONIC TWINS: NORMAL
MSHCG-MOM: NORMAL
MSHCG: NORMAL IU/L
NUCHAL TRANSLUC (NT): 1.3
NUCHAL TRANSLUC (NT): 1.3 MM
NUCHAL TRANSLUCENCY TWIN B: NORMAL MM
NUMBER OF FETUSES: 1
NUMBER OF FETUSES: NORMAL
PAPP-A MOM: 536.2 NG/ML
PATIENT WEIGHT UNITS: NORMAL
PATIENT WEIGHT: NORMAL
PREV TRISOMY PREG: NORMAL
RACE (MATERNAL): NORMAL
RACE: NORMAL
READING MD CERT NUM: NORMAL
READING MD NAME: NORMAL
REPEAT SPECIMEN?: NORMAL
SMOKING: NORMAL
SMOKING: NORMAL
SONOGRAPHER CERT NO: NORMAL
SONOGRAPHER CERT NO: NORMAL
SONOGRAPHER NAME: NORMAL
SONOGRAPHER NAME: NORMAL
ULTRASOUND DATE: NORMAL
ULTRASOUND DATE: NORMAL

## 2023-06-06 ENCOUNTER — TELEPHONE (OUTPATIENT)
Dept: PERINATAL CARE | Age: 33
End: 2023-06-06

## 2023-06-06 NOTE — TELEPHONE ENCOUNTER
Patient called regarding first trimester screen results. Informed patient that the lab draw was performed outside of the gestational age window for the first trimester screen, so the results could not be calculated per the lab. Informed the patient that there may be other screening options available to discuss at a follow up visit.

## 2023-06-08 ENCOUNTER — ROUTINE PRENATAL (OUTPATIENT)
Dept: OBGYN CLINIC | Age: 33
End: 2023-06-08

## 2023-06-08 VITALS
WEIGHT: 166 LBS | DIASTOLIC BLOOD PRESSURE: 68 MMHG | SYSTOLIC BLOOD PRESSURE: 100 MMHG | BODY MASS INDEX: 30.36 KG/M2 | HEART RATE: 103 BPM

## 2023-06-08 DIAGNOSIS — O09.92 HRP (HIGH RISK PREGNANCY), SECOND TRIMESTER: Primary | ICD-10-CM

## 2023-06-08 DIAGNOSIS — O99.332 TOBACCO SMOKING AFFECTING PREGNANCY IN SECOND TRIMESTER: ICD-10-CM

## 2023-06-08 DIAGNOSIS — I82.890 THROMBOSIS OF OVARIAN VEIN: ICD-10-CM

## 2023-06-08 DIAGNOSIS — Z3A.14 14 WEEKS GESTATION OF PREGNANCY: ICD-10-CM

## 2023-06-08 DIAGNOSIS — A60.00 GENITAL HERPES SIMPLEX, UNSPECIFIED SITE: ICD-10-CM

## 2023-06-08 RX ORDER — .BETA.-CAROTENE, ASCORBIC ACID, CHOLECALCIFEROL, .ALPHA.-TOCOPHEROL ACETATE, DL-, THIAMINE MONONITRATE, RIBOFLAVIN, NIACINAMIDE, PYRIDOXINE HYDROCHLORIDE, FOLIC ACID, CYANOCOBALAMIN, CALCIUM PANTOTHENATE, CALCIUM CARBONATE, FERROUS FUMARATE, ZINC OXIDE, AND DOCUSATE SODIUM 1000; 100; 400; 30; 3; 3; 15; 20; 1; 12; 7; 200; 29; 20; 25 [IU]/1; MG/1; [IU]/1; [IU]/1; MG/1; MG/1; MG/1; MG/1; MG/1; UG/1; MG/1; MG/1; MG/1; MG/1; MG/1
TABLET, COATED ORAL
COMMUNITY

## 2023-06-08 NOTE — PROGRESS NOTES
Rahul Banks is a 33 y.o. female 14w2d        OB History    Para Term  AB Living   4 3 2     3   SAB IAB Ectopic Molar Multiple Live Births             3      # Outcome Date GA Lbr Jose/2nd Weight Sex Delivery Anes PTL Lv   4 Current            3 Term 17 40w1d   M Vag-Spont   CHRISTINA   2 Term 13 39w0d  8 lb 2 oz (3.685 kg) M Vag-Spont EPI N CHRISTINA      Birth Comments: pt had ovarian dvt after delivery    1 Para 11 39w0d  7 lb 2 oz (3.232 kg) M Vag-Spont EPI N CHRISTINA       Chief Complaint   Patient presents with    High Risk Pregnancy            Blood pressure 100/68, pulse (!) 103, weight 166 lb (75.3 kg), last menstrual period 2023, currently breastfeeding. The patient was seen and evaluated. There was N/A fetal movements. No contractions or leakage of fluid. Signs and symptoms of  labor as well as labor were reviewed. Dates were reviewed with the patient. The patient will return to the office for her next visit in 4 weeks. See antepartum flow sheet. Patient Active Problem List    Diagnosis Date Noted    Encounter for IUD removal 10/17/2022     Priority: Medium    Anxiety associated with depression 2022     Priority: Medium    Myofascial muscle pain 2022     Priority: Medium    ADHD (attention deficit hyperactivity disorder), combined type 2022     Priority: Medium    Adult BMI 27.0-27.9 kg/sq m 2022     Priority: Medium    Chronic nonintractable headache 2022     Priority: Medium        Diagnosis Orders   1. HRP (high risk pregnancy), second trimester        2. 14 weeks gestation of pregnancy        3. Genital herpes simplex, unspecified site        4. Tobacco smoking affecting pregnancy in second trimester        5. Thrombosis of ovarian vein          Patient doing well. Fetal heart rate auscultated at 144 bpm and fundal height was not measured today due to early gestational age.   Patient advised to continue taking prenatal

## 2023-07-06 ENCOUNTER — HOSPITAL ENCOUNTER (OUTPATIENT)
Age: 33
Setting detail: SPECIMEN
Discharge: HOME | End: 2023-07-06

## 2023-07-06 ENCOUNTER — ROUTINE PRENATAL (OUTPATIENT)
Dept: OBGYN CLINIC | Age: 33
End: 2023-07-06

## 2023-07-06 VITALS
WEIGHT: 170 LBS | BODY MASS INDEX: 31.09 KG/M2 | SYSTOLIC BLOOD PRESSURE: 122 MMHG | HEART RATE: 105 BPM | DIASTOLIC BLOOD PRESSURE: 70 MMHG

## 2023-07-06 DIAGNOSIS — O09.92 HRP (HIGH RISK PREGNANCY), SECOND TRIMESTER: ICD-10-CM

## 2023-07-06 DIAGNOSIS — O99.332 TOBACCO SMOKING AFFECTING PREGNANCY IN SECOND TRIMESTER: ICD-10-CM

## 2023-07-06 DIAGNOSIS — I82.890 THROMBOSIS OF OVARIAN VEIN: ICD-10-CM

## 2023-07-06 DIAGNOSIS — O09.92 HRP (HIGH RISK PREGNANCY), SECOND TRIMESTER: Primary | ICD-10-CM

## 2023-07-06 DIAGNOSIS — Z3A.18 18 WEEKS GESTATION OF PREGNANCY: ICD-10-CM

## 2023-07-06 DIAGNOSIS — A60.00 GENITAL HERPES SIMPLEX, UNSPECIFIED SITE: ICD-10-CM

## 2023-07-06 DIAGNOSIS — F90.2 ADHD (ATTENTION DEFICIT HYPERACTIVITY DISORDER), COMBINED TYPE: ICD-10-CM

## 2023-07-06 NOTE — PROGRESS NOTES
Patient was in the office today for a AFP. Draw per physician order using sterile technique.   Drawn from the right antecubital.
Patient doing well. Patient was told that the CIPRIANO was different at [facility]. Explained to patient that in most cases, CIPRIANO is set by the first ultrasound. Fundal height today is consistent with CIPRIANO associated with pregnancy episode. Fetal heart rate auscultated at 146 bpm and fundal height is 18 cm. today. Quad screen done today. Patient advised to continue taking prenatal vitamins and to rest as necessary. Bronwyn Baltazar am scribing for, and in the presence of Dr. Sonal Allison. Electronically signed by: Durel Lombard 7/6/23 3:06 PM   I agree to the above documentation placed by my scribe Durel Lombard. I reviewed the scribe's note and agree with the documented findings and plan of care. Any areas of disagreement are noted on the chart. I have personally evaluated this patient. Additional findings are as noted. I agree with the chief complaint, past medical history, past surgical history, allergies, medications, social and family history as documented unless otherwise noted below.      Electronically signed by Sonal Allison MD on 7/7/2023 at 2:08 AM

## 2023-07-10 LAB
REASON FOR REJECTION: NORMAL
SPECIMEN SOURCE: NORMAL
ZZ NTE CLEAN UP: ORDERED TEST: NORMAL

## 2023-07-11 ENCOUNTER — TELEPHONE (OUTPATIENT)
Dept: OBGYN CLINIC | Age: 33
End: 2023-07-11

## 2023-07-11 DIAGNOSIS — O09.92 HRP (HIGH RISK PREGNANCY), SECOND TRIMESTER: Primary | ICD-10-CM

## 2023-07-11 NOTE — TELEPHONE ENCOUNTER
Pt notified lab states they did not receive her blood for AFP testing at lab  Pt will have blood redrawn when she goes to [facility] appointment 7-  Order in [place]

## 2023-07-25 ENCOUNTER — ROUTINE PRENATAL (OUTPATIENT)
Dept: PERINATAL CARE | Age: 33
End: 2023-07-25
Payer: COMMERCIAL

## 2023-07-25 ENCOUNTER — TELEPHONE (OUTPATIENT)
Dept: OBGYN CLINIC | Age: 33
End: 2023-07-25

## 2023-07-25 VITALS
HEART RATE: 78 BPM | SYSTOLIC BLOOD PRESSURE: 114 MMHG | RESPIRATION RATE: 16 BRPM | DIASTOLIC BLOOD PRESSURE: 68 MMHG | TEMPERATURE: 98.6 F | HEIGHT: 62 IN | BODY MASS INDEX: 31.28 KG/M2 | WEIGHT: 170 LBS

## 2023-07-25 DIAGNOSIS — O99.212 OBESITY AFFECTING PREGNANCY IN SECOND TRIMESTER: ICD-10-CM

## 2023-07-25 DIAGNOSIS — Z86.718 HISTORY OF DVT (DEEP VEIN THROMBOSIS): Primary | ICD-10-CM

## 2023-07-25 DIAGNOSIS — O35.2XX0 HEREDITARY FAMILIAL DISEASE AFFECTING MANAGEMENT OF MOTHER AND POSSIBLY AFFECTING FETUS, ANTEPARTUM, SINGLE OR UNSPECIFIED FETUS: Primary | ICD-10-CM

## 2023-07-25 DIAGNOSIS — O99.330 TOBACCO USE DISORDER COMPLICATING PREGNANCY, CHILDBIRTH, OR PUERPERIUM, ANTEPARTUM: ICD-10-CM

## 2023-07-25 DIAGNOSIS — Z36.86 ENCOUNTER FOR SCREENING FOR RISK OF PRE-TERM LABOR: ICD-10-CM

## 2023-07-25 DIAGNOSIS — Z3A.21 21 WEEKS GESTATION OF PREGNANCY: ICD-10-CM

## 2023-07-25 DIAGNOSIS — O09.92 HRP (HIGH RISK PREGNANCY), SECOND TRIMESTER: ICD-10-CM

## 2023-07-25 LAB
ABDOMINAL CIRCUMFERENCE: NORMAL
ABDOMINAL CIRCUMFERENCE: NORMAL
BIPARIETAL DIAMETER: NORMAL
BIPARIETAL DIAMETER: NORMAL
ESTIMATED FETAL WEIGHT: NORMAL
ESTIMATED FETAL WEIGHT: NORMAL
FEMORAL DIAMETER: NORMAL
FEMORAL DIAMETER: NORMAL
HC/AC: NORMAL
HC/AC: NORMAL
HEAD CIRCUMFERENCE: NORMAL
HEAD CIRCUMFERENCE: NORMAL

## 2023-07-25 PROCEDURE — 76811 OB US DETAILED SNGL FETUS: CPT | Performed by: OBSTETRICS & GYNECOLOGY

## 2023-07-25 PROCEDURE — 76817 TRANSVAGINAL US OBSTETRIC: CPT | Performed by: OBSTETRICS & GYNECOLOGY

## 2023-07-25 PROCEDURE — 99999 PR OFFICE/OUTPT VISIT,PROCEDURE ONLY: CPT | Performed by: OBSTETRICS & GYNECOLOGY

## 2023-07-25 NOTE — PROGRESS NOTES
Obstetric/Gynecology Maternal Fetal Medicine Resident Note      Patient is amenable to  formal consult with MFM attending physician for Personal Hx VTE.      Brie Soto DO  OBGYAD Resident, PGY2  [facility]  7/25/2023, 8:44 AM

## 2023-07-31 ENCOUNTER — ROUTINE PRENATAL (OUTPATIENT)
Dept: PERINATAL CARE | Age: 33
End: 2023-07-31
Payer: COMMERCIAL

## 2023-07-31 ENCOUNTER — HOSPITAL ENCOUNTER (OUTPATIENT)
Age: 33
Discharge: HOME | End: 2023-07-31
Payer: COMMERCIAL

## 2023-07-31 VITALS
HEART RATE: 102 BPM | DIASTOLIC BLOOD PRESSURE: 81 MMHG | TEMPERATURE: 98.3 F | SYSTOLIC BLOOD PRESSURE: 127 MMHG | HEIGHT: 62 IN | WEIGHT: 173 LBS | BODY MASS INDEX: 31.83 KG/M2 | RESPIRATION RATE: 16 BRPM

## 2023-07-31 DIAGNOSIS — Z86.718 HISTORY OF DEEP VEIN THROMBOSIS (DVT) DURING PREGNANCY: ICD-10-CM

## 2023-07-31 DIAGNOSIS — O35.2XX0 HEREDITARY FAMILIAL DISEASE AFFECTING MANAGEMENT OF MOTHER AND POSSIBLY AFFECTING FETUS, ANTEPARTUM, SINGLE OR UNSPECIFIED FETUS: Primary | ICD-10-CM

## 2023-07-31 DIAGNOSIS — Z87.59 HISTORY OF DEEP VEIN THROMBOSIS (DVT) DURING PREGNANCY: ICD-10-CM

## 2023-07-31 DIAGNOSIS — Z3A.21 21 WEEKS GESTATION OF PREGNANCY: ICD-10-CM

## 2023-07-31 DIAGNOSIS — O35.8XX0 SUSPECTED DAMAGE TO FETUS FROM DISEASE IN MOTHER, ANTEPARTUM CONDITION, SINGLE OR UNSPECIFIED FETUS: ICD-10-CM

## 2023-07-31 DIAGNOSIS — O99.330 TOBACCO USE DISORDER COMPLICATING PREGNANCY, CHILDBIRTH, OR PUERPERIUM, ANTEPARTUM: ICD-10-CM

## 2023-07-31 DIAGNOSIS — O99.212 OBESITY AFFECTING PREGNANCY IN SECOND TRIMESTER: ICD-10-CM

## 2023-07-31 LAB — HCYS SERPL-SCNC: 5.6 UMOL/L

## 2023-07-31 PROCEDURE — 85306 CLOT INHIBIT PROT S FREE: CPT

## 2023-07-31 PROCEDURE — 99245 OFF/OP CONSLTJ NEW/EST HI 55: CPT | Performed by: OBSTETRICS & GYNECOLOGY

## 2023-07-31 PROCEDURE — 85303 CLOT INHIBIT PROT C ACTIVITY: CPT

## 2023-07-31 PROCEDURE — 81291 MTHFR GENE: CPT

## 2023-07-31 PROCEDURE — 83090 ASSAY OF HOMOCYSTEINE: CPT

## 2023-07-31 PROCEDURE — 85300 ANTITHROMBIN III ACTIVITY: CPT

## 2023-07-31 PROCEDURE — 36415 COLL VENOUS BLD VENIPUNCTURE: CPT

## 2023-07-31 PROCEDURE — 85730 THROMBOPLASTIN TIME PARTIAL: CPT

## 2023-07-31 PROCEDURE — 81241 F5 GENE: CPT

## 2023-07-31 PROCEDURE — G8427 DOCREV CUR MEDS BY ELIG CLIN: HCPCS | Performed by: OBSTETRICS & GYNECOLOGY

## 2023-07-31 PROCEDURE — 85610 PROTHROMBIN TIME: CPT

## 2023-07-31 PROCEDURE — 81240 F2 GENE: CPT

## 2023-07-31 PROCEDURE — G8419 CALC BMI OUT NRM PARAM NOF/U: HCPCS | Performed by: OBSTETRICS & GYNECOLOGY

## 2023-07-31 PROCEDURE — 86147 CARDIOLIPIN ANTIBODY EA IG: CPT

## 2023-07-31 PROCEDURE — 85613 RUSSELL VIPER VENOM DILUTED: CPT

## 2023-07-31 PROCEDURE — 82105 ALPHA-FETOPROTEIN SERUM: CPT

## 2023-08-03 ENCOUNTER — ROUTINE PRENATAL (OUTPATIENT)
Dept: OBGYN CLINIC | Age: 33
End: 2023-08-03

## 2023-08-03 VITALS
SYSTOLIC BLOOD PRESSURE: 110 MMHG | HEART RATE: 104 BPM | BODY MASS INDEX: 31.09 KG/M2 | DIASTOLIC BLOOD PRESSURE: 76 MMHG | WEIGHT: 170 LBS

## 2023-08-03 DIAGNOSIS — O09.92 HRP (HIGH RISK PREGNANCY), SECOND TRIMESTER: Primary | ICD-10-CM

## 2023-08-03 DIAGNOSIS — Z3A.22 22 WEEKS GESTATION OF PREGNANCY: ICD-10-CM

## 2023-08-03 DIAGNOSIS — O99.332 TOBACCO SMOKING AFFECTING PREGNANCY IN SECOND TRIMESTER: ICD-10-CM

## 2023-08-03 DIAGNOSIS — A60.00 GENITAL HERPES SIMPLEX, UNSPECIFIED SITE: ICD-10-CM

## 2023-08-03 DIAGNOSIS — Z86.718 HISTORY OF DVT (DEEP VEIN THROMBOSIS): ICD-10-CM

## 2023-08-03 LAB
AFP INTERPRETATION: NORMAL
AFP MOM: 0.83
AFP SPECIMEN: NORMAL
AFP: 60 NG/ML
DATE OF BIRTH: NORMAL
DATING METHOD: NORMAL
DETERMINED BY: NORMAL
DIABETIC: NO
DONOR EGG?: NO
DUE DATE: NORMAL
ESTIMATED DUE DATE: NORMAL
FAMILY HISTORY NTD: NO
GESTATIONAL AGE: NORMAL
IN VITRO FERTILIZATION: NO
INSULIN REQ DIABETES: NO
LAST MENSTRUAL PERIOD: NORMAL
MATERNAL AGE AT EDD: 31.9 YR
MATERNAL WEIGHT: 173
MONOCHORIONIC TWINS: NORMAL
MTHFR INTERPRETATION: NORMAL
MTHFR MUTATION A1286C: NORMAL
MTHFR MUTATION C665T: NORMAL
NUMBER OF FETUSES: NORMAL
PATIENT WEIGHT UNITS: NORMAL
PATIENT WEIGHT: NORMAL
RACE (MATERNAL): NORMAL
RACE: NORMAL
REPEAT SPECIMEN?: NO
SMOKING: NORMAL
SMOKING: NORMAL
SPECIMEN: NORMAL
VALPROIC/CARBAMAZEP: NORMAL
ZZ NTE CLEAN UP: HISTORY: NO

## 2023-08-03 PROCEDURE — 0502F SUBSEQUENT PRENATAL CARE: CPT | Performed by: SPECIALIST

## 2023-08-03 RX ORDER — SENNA AND DOCUSATE SODIUM 50; 8.6 MG/1; MG/1
1 TABLET, FILM COATED ORAL DAILY
COMMUNITY

## 2023-08-03 NOTE — PROGRESS NOTES
Yariel Jj is a 33 y.o. female 22w2d        OB History    Para Term  AB Living   4 3 2     3   SAB IAB Ectopic Molar Multiple Live Births             3      # Outcome Date GA Lbr Jose/2nd Weight Sex Delivery Anes PTL Lv   4 Current            3 Term 17 40w1d   M Vag-Spont   CHRISTINA   2 Term 13 39w0d  8 lb 2 oz (3.685 kg) M Vag-Spont EPI N CHRISTINA      Birth Comments: pt had ovarian dvt after delivery    1 Para 11 39w0d  7 lb 2 oz (3.232 kg) M Vag-Spont EPI N CHRISTINA       Chief Complaint   Patient presents with    High Risk Pregnancy        Blood pressure 110/76, pulse (!) 104, weight 170 lb (77.1 kg), last menstrual period 2023, currently breastfeeding. The patient was seen and evaluated. There was positive fetal movements. No contractions or leakage of fluid. Signs and symptoms of  labor as well as labor were reviewed. The patient's anatomy ultrasound has been completed at [facility]. The S/S of Pre-Eclampsia were reviewed with the patient in detail. She is to report any of these if they occur. She currently denies any of these. The patient is RH positive Rhogam Ordered: Not indicated. Patient Active Problem List    Diagnosis Date Noted    Encounter for IUD removal 10/17/2022     Priority: Medium    Anxiety associated with depression 2022     Priority: Medium    Myofascial muscle pain 2022     Priority: Medium    ADHD (attention deficit hyperactivity disorder), combined type 2022     Priority: Medium    Adult BMI 27.0-27.9 kg/sq m 2022     Priority: Medium    Chronic nonintractable headache 2022     Priority: Medium        Diagnosis Orders   1. HRP (high risk pregnancy), second trimester        2. 22 weeks gestation of pregnancy        3. History of DVT (deep vein thrombosis)        4. Genital herpes simplex, unspecified site        5.  Tobacco smoking affecting pregnancy in second trimester            Patient complains of abdominal

## 2023-08-04 LAB
CARDIOLIPIN IGA SER IA-ACNC: 0.9 APL (ref 0–14)
CARDIOLIPIN IGG SER IA-ACNC: <0.5 GPL (ref 0–10)
CARDIOLIPIN IGM SER IA-ACNC: 1.7 MPL (ref 0–10)
DILUTE RUSSELL VIPER VENOM TIME: NORMAL
F2 C.20210G>A GENO BLD/T: NEGATIVE
F5 P.R506Q BLD/T QL: NEGATIVE
INR PPP: 1
LUPUS ANTICOAG: NORMAL
PARTIAL THROMBOPLASTIN TIME: 23.7 SEC (ref 23–36.5)
PROTHROMBIN TIME: 12.5 SEC (ref 11.7–14.9)
SPECIMEN SOURCE: NORMAL
SPECIMEN SOURCE: NORMAL

## 2023-08-10 LAB
CARDIOLIPIN IGA SER IA-ACNC: 0.9 APL (ref 0–14)
CARDIOLIPIN IGG SER IA-ACNC: <0.5 GPL (ref 0–10)
CARDIOLIPIN IGM SER IA-ACNC: 1.7 MPL (ref 0–10)
DILUTE RUSSELL VIPER VENOM TIME: NORMAL
INR PPP: 1
PARTIAL THROMBOPLASTIN TIME: 23.7 SEC (ref 23–36.5)
PROTHROMBIN TIME: 12.5 SEC (ref 11.7–14.9)

## 2023-08-11 LAB
AT III ACT/NOR PPP CHRO: 42 % (ref 83–122)
PROTEIN C ACTIVITY: 77 %
PROTEIN S ACTIVITY: 17 % (ref 59–130)

## 2023-08-12 LAB
AT III AG ACT/NOR PPP IA: 88 % (ref 82–136)
PROT C AG ACT/NOR PPP IA: >95 % (ref 63–153)
PROT S AG ACT/NOR PPP IA: 87 % (ref 63–126)

## 2023-08-13 LAB — PROT S FREE AG ACT/NOR PPP IA: 55 % (ref 55–123)

## 2023-08-15 ENCOUNTER — TELEPHONE (OUTPATIENT)
Dept: PERINATAL CARE | Age: 33
End: 2023-08-15

## 2023-08-15 NOTE — TELEPHONE ENCOUNTER
Dr. Blanka Moralez reviewed thrombophilia lab work and ordered pt to start Foltx daily. Pt. Notified, agreed and asked to have called into [facility] pharmacy in [facility], [address] [place] [address] Rd called into [facility] pharmacy, [phone], d/t extended wait for pharamacist, order left on voice mail, Foltx, 1 po daily, 16 week supply and no refills.

## 2023-08-28 ENCOUNTER — ROUTINE PRENATAL (OUTPATIENT)
Dept: PERINATAL CARE | Age: 33
End: 2023-08-28
Payer: COMMERCIAL

## 2023-08-28 VITALS
HEIGHT: 62 IN | DIASTOLIC BLOOD PRESSURE: 74 MMHG | BODY MASS INDEX: 32.02 KG/M2 | WEIGHT: 174 LBS | SYSTOLIC BLOOD PRESSURE: 119 MMHG | RESPIRATION RATE: 16 BRPM | TEMPERATURE: 98.5 F | HEART RATE: 96 BPM

## 2023-08-28 DIAGNOSIS — O35.8XX0 SUSPECTED DAMAGE TO FETUS FROM DISEASE IN MOTHER, ANTEPARTUM CONDITION, SINGLE OR UNSPECIFIED FETUS: Primary | ICD-10-CM

## 2023-08-28 DIAGNOSIS — O99.212 OBESITY AFFECTING PREGNANCY IN SECOND TRIMESTER: ICD-10-CM

## 2023-08-28 DIAGNOSIS — Z36.4 ULTRASOUND FOR ANTENATAL SCREENING FOR FETAL GROWTH RESTRICTION: ICD-10-CM

## 2023-08-28 DIAGNOSIS — Z3A.25 25 WEEKS GESTATION OF PREGNANCY: ICD-10-CM

## 2023-08-28 DIAGNOSIS — O99.330 TOBACCO USE DISORDER COMPLICATING PREGNANCY, CHILDBIRTH, OR PUERPERIUM, ANTEPARTUM: ICD-10-CM

## 2023-08-28 LAB
ABDOMINAL CIRCUMFERENCE: NORMAL
BIPARIETAL DIAMETER: NORMAL
ESTIMATED FETAL WEIGHT: NORMAL
FEMORAL DIAMETER: NORMAL
HC/AC: NORMAL
HEAD CIRCUMFERENCE: NORMAL

## 2023-08-28 PROCEDURE — 99999 PR OFFICE/OUTPT VISIT,PROCEDURE ONLY: CPT | Performed by: OBSTETRICS & GYNECOLOGY

## 2023-08-28 PROCEDURE — 76825 ECHO EXAM OF FETAL HEART: CPT | Performed by: OBSTETRICS & GYNECOLOGY

## 2023-08-28 PROCEDURE — 76827 ECHO EXAM OF FETAL HEART: CPT | Performed by: OBSTETRICS & GYNECOLOGY

## 2023-08-28 PROCEDURE — 76816 OB US FOLLOW-UP PER FETUS: CPT | Performed by: OBSTETRICS & GYNECOLOGY

## 2023-08-28 PROCEDURE — 93325 DOPPLER ECHO COLOR FLOW MAPG: CPT | Performed by: OBSTETRICS & GYNECOLOGY

## 2023-08-28 RX ORDER — CYANOCOBALAMIN/FOLIC AC/VIT B6 1-2.2-25MG
1 TABLET ORAL DAILY
COMMUNITY

## 2023-09-05 ENCOUNTER — ROUTINE PRENATAL (OUTPATIENT)
Dept: OBGYN CLINIC | Age: 33
End: 2023-09-05
Payer: COMMERCIAL

## 2023-09-05 ENCOUNTER — HOSPITAL ENCOUNTER (OUTPATIENT)
Age: 33
Setting detail: SPECIMEN
Discharge: HOME | End: 2023-09-05

## 2023-09-05 VITALS
DIASTOLIC BLOOD PRESSURE: 72 MMHG | SYSTOLIC BLOOD PRESSURE: 116 MMHG | HEART RATE: 91 BPM | WEIGHT: 175 LBS | BODY MASS INDEX: 32.01 KG/M2

## 2023-09-05 DIAGNOSIS — A60.00 GENITAL HERPES SIMPLEX, UNSPECIFIED SITE: ICD-10-CM

## 2023-09-05 DIAGNOSIS — F90.2 ADHD (ATTENTION DEFICIT HYPERACTIVITY DISORDER), COMBINED TYPE: ICD-10-CM

## 2023-09-05 DIAGNOSIS — Z3A.27 27 WEEKS GESTATION OF PREGNANCY: ICD-10-CM

## 2023-09-05 DIAGNOSIS — O09.92 HRP (HIGH RISK PREGNANCY), SECOND TRIMESTER: Primary | ICD-10-CM

## 2023-09-05 DIAGNOSIS — E72.12 METHYLENETETRAHYDROFOLATE REDUCTASE (MTHFR) DEFICIENCY AFFECTING PREGNANCY, ANTEPARTUM (HCC): ICD-10-CM

## 2023-09-05 DIAGNOSIS — O99.332 TOBACCO SMOKING AFFECTING PREGNANCY IN SECOND TRIMESTER: ICD-10-CM

## 2023-09-05 DIAGNOSIS — O99.280 METHYLENETETRAHYDROFOLATE REDUCTASE (MTHFR) DEFICIENCY AFFECTING PREGNANCY, ANTEPARTUM (HCC): ICD-10-CM

## 2023-09-05 DIAGNOSIS — I82.890 THROMBOSIS OF OVARIAN VEIN: ICD-10-CM

## 2023-09-05 DIAGNOSIS — Z86.718 HISTORY OF DVT (DEEP VEIN THROMBOSIS): ICD-10-CM

## 2023-09-05 PROCEDURE — 0502F SUBSEQUENT PRENATAL CARE: CPT | Performed by: SPECIALIST

## 2023-09-05 PROCEDURE — 36415 COLL VENOUS BLD VENIPUNCTURE: CPT | Performed by: SPECIALIST

## 2023-09-05 NOTE — PROGRESS NOTES
Patient was in the office today for a CBC, GTT, TB PALLIDUM. Draw per physician order using sterile technique. Drawn from the RIGHT ANTECUBITAL. PT DECLINED TDAP.
disorder), combined type 05/05/2022     Priority: Medium    Adult BMI 27.0-27.9 kg/sq m 05/05/2022     Priority: Medium    Chronic nonintractable headache 05/05/2022     Priority: Medium        Diagnosis Orders   1. HRP (high risk pregnancy), second trimester  Glucose tolerance, 1 hour    CBC with Auto Differential    T. Pallidum Ab    68943 - Venipuncture      2. 27 weeks gestation of pregnancy  Glucose tolerance, 1 hour    CBC with Auto Differential    T. Pallidum Ab    34867 - Venipuncture      3. History of DVT (deep vein thrombosis)        4. Genital herpes simplex, unspecified site        5. Tobacco smoking affecting pregnancy in second trimester        6. Thrombosis of ovarian vein        7. ADHD (attention deficit hyperactivity disorder), combined type        8. Methylenetetrahydrofolate reductase (MTHFR) deficiency affecting pregnancy, antepartum [facility])            Patient doing well. 28 week labs have been ordered today including 1 hour glucose testing, T. Pallidum AB, and CBC. Patient refusing Tdap at this time. Counseling regarding Tdap and benefits of Tdap vaccine for protection of the baby was provided to patient and patient was encouraged to reconsider her decision. Fetal heart rate ausculted at 143 bpm and fundal height is 30 cm. today. Patient advised to continue taking prenatal vitamins and to rest as necessary. The patient will return to the office for her next visit in 2 weeks. See antepartum flow sheet. Ramon Zimmerman am scribing for, and in the presence of Dr. Stephen Dalal. Electronically signed by: Ortiz Decker 9/5/23 4:13 PM   I agree to the above documentation placed by my scribe Ortiz Decker. I reviewed the scribe's note and agree with the documented findings and plan of care. Any areas of disagreement are noted on the chart. I have personally evaluated this patient. Additional findings are as noted.  I agree with the chief complaint, past medical history,

## 2023-09-06 DIAGNOSIS — O09.92 HRP (HIGH RISK PREGNANCY), SECOND TRIMESTER: ICD-10-CM

## 2023-09-06 DIAGNOSIS — R73.09 ELEVATED GLUCOSE TOLERANCE TEST: Primary | ICD-10-CM

## 2023-09-06 DIAGNOSIS — Z3A.27 27 WEEKS GESTATION OF PREGNANCY: ICD-10-CM

## 2023-09-06 LAB
BASOPHILS # BLD: 0.03 K/UL (ref 0–0.2)
BASOPHILS NFR BLD: 0 % (ref 0–2)
EOSINOPHIL # BLD: 0.12 K/UL (ref 0–0.44)
EOSINOPHILS RELATIVE PERCENT: 1 % (ref 1–4)
ERYTHROCYTE [DISTWIDTH] IN BLOOD BY AUTOMATED COUNT: 13.9 % (ref 11.8–14.4)
GLUCOSE 1H P 50 G GLC PO SERPL-MCNC: 138 MG/DL (ref 70–135)
GLUCOSE ADMINISTRATION: ABNORMAL
HCT VFR BLD AUTO: 33.2 % (ref 36.3–47.1)
HGB BLD-MCNC: 10.7 G/DL (ref 11.9–15.1)
IMM GRANULOCYTES # BLD AUTO: 0.05 K/UL (ref 0–0.3)
IMM GRANULOCYTES NFR BLD: 1 %
LYMPHOCYTES NFR BLD: 1.83 K/UL (ref 1.1–3.7)
LYMPHOCYTES RELATIVE PERCENT: 19 % (ref 24–43)
MCH RBC QN AUTO: 32.7 PG (ref 25.2–33.5)
MCHC RBC AUTO-ENTMCNC: 32.2 G/DL (ref 28.4–34.8)
MCV RBC AUTO: 101.5 FL (ref 82.6–102.9)
MONOCYTES NFR BLD: 0.47 K/UL (ref 0.1–1.2)
MONOCYTES NFR BLD: 5 % (ref 3–12)
NEUTROPHILS NFR BLD: 74 % (ref 36–65)
NEUTS SEG NFR BLD: 6.91 K/UL (ref 1.5–8.1)
NRBC BLD-RTO: 0 PER 100 WBC
PLATELET # BLD AUTO: 222 K/UL (ref 138–453)
PMV BLD AUTO: 11.3 FL (ref 8.1–13.5)
RBC # BLD AUTO: 3.27 M/UL (ref 3.95–5.11)
T PALLIDUM AB SER QL IA: NONREACTIVE
WBC OTHER # BLD: 9.4 K/UL (ref 3.5–11.3)

## 2023-09-06 RX ORDER — FERROUS SULFATE 325(65) MG
325 TABLET ORAL 2 TIMES DAILY
Qty: 180 TABLET | Refills: 1 | Status: SHIPPED | OUTPATIENT
Start: 2023-09-06

## 2023-09-07 ENCOUNTER — HOSPITAL ENCOUNTER (OUTPATIENT)
Age: 33
Discharge: HOME | End: 2023-09-07
Payer: COMMERCIAL

## 2023-09-07 DIAGNOSIS — R73.09 ELEVATED GLUCOSE TOLERANCE TEST: ICD-10-CM

## 2023-09-07 LAB
AMOUNT GLUCOSE GIVEN: NORMAL G
GLUCOSE 2H P 100 G GLC PO SERPL-MCNC: 88 MG/DL (ref 65–94)
GLUCOSE 3H P 100 G GLC PO SERPL-MCNC: 155 MG/DL (ref 65–179)
GLUCOSE TOLERANCE TEST 2 HOUR: 127 MG/DL (ref 65–154)
GLUCOSE TOLERANCE TEST 3 HOUR: 82 MG/DL (ref 65–139)

## 2023-09-07 PROCEDURE — 82952 GTT-ADDED SAMPLES: CPT

## 2023-09-07 PROCEDURE — 36415 COLL VENOUS BLD VENIPUNCTURE: CPT

## 2023-09-07 PROCEDURE — 82951 GLUCOSE TOLERANCE TEST (GTT): CPT

## 2023-09-11 ENCOUNTER — TELEPHONE (OUTPATIENT)
Dept: OBGYN CLINIC | Age: 33
End: 2023-09-11

## 2023-09-25 ENCOUNTER — ROUTINE PRENATAL (OUTPATIENT)
Dept: PERINATAL CARE | Age: 33
End: 2023-09-25
Payer: COMMERCIAL

## 2023-09-25 VITALS
TEMPERATURE: 98.4 F | BODY MASS INDEX: 31.47 KG/M2 | DIASTOLIC BLOOD PRESSURE: 66 MMHG | HEIGHT: 62 IN | SYSTOLIC BLOOD PRESSURE: 118 MMHG | WEIGHT: 171 LBS | RESPIRATION RATE: 16 BRPM | HEART RATE: 92 BPM

## 2023-09-25 DIAGNOSIS — O35.8XX0 SUSPECTED DAMAGE TO FETUS FROM DISEASE IN MOTHER, ANTEPARTUM CONDITION, SINGLE OR UNSPECIFIED FETUS: ICD-10-CM

## 2023-09-25 DIAGNOSIS — Z36.4 ULTRASOUND FOR ANTENATAL SCREENING FOR FETAL GROWTH RESTRICTION: ICD-10-CM

## 2023-09-25 DIAGNOSIS — O99.810 ABNORMAL MATERNAL GLUCOSE TOLERANCE, ANTEPARTUM: Primary | ICD-10-CM

## 2023-09-25 DIAGNOSIS — O99.283 METHYLENETETRAHYDROFOLATE REDUCTASE DEFICIENCY AFFECTING PREGNANCY IN THIRD TRIMESTER (HCC): ICD-10-CM

## 2023-09-25 DIAGNOSIS — Z13.89 ENCOUNTER FOR ROUTINE SCREENING FOR MALFORMATION USING ULTRASONICS: ICD-10-CM

## 2023-09-25 DIAGNOSIS — E72.12 METHYLENETETRAHYDROFOLATE REDUCTASE DEFICIENCY AFFECTING PREGNANCY IN THIRD TRIMESTER (HCC): ICD-10-CM

## 2023-09-25 DIAGNOSIS — Z87.59 HISTORY OF DEEP VEIN THROMBOSIS (DVT) DURING PREGNANCY: ICD-10-CM

## 2023-09-25 DIAGNOSIS — Z3A.29 29 WEEKS GESTATION OF PREGNANCY: ICD-10-CM

## 2023-09-25 DIAGNOSIS — O99.330 TOBACCO USE DISORDER COMPLICATING PREGNANCY, CHILDBIRTH, OR PUERPERIUM, ANTEPARTUM: ICD-10-CM

## 2023-09-25 DIAGNOSIS — Z86.718 HISTORY OF DEEP VEIN THROMBOSIS (DVT) DURING PREGNANCY: ICD-10-CM

## 2023-09-25 DIAGNOSIS — O35.2XX0 HEREDITARY FAMILIAL DISEASE AFFECTING MANAGEMENT OF MOTHER AND POSSIBLY AFFECTING FETUS, ANTEPARTUM, SINGLE OR UNSPECIFIED FETUS: ICD-10-CM

## 2023-09-25 PROCEDURE — 99999 PR OFFICE/OUTPT VISIT,PROCEDURE ONLY: CPT | Performed by: OBSTETRICS & GYNECOLOGY

## 2023-09-25 PROCEDURE — 76819 FETAL BIOPHYS PROFIL W/O NST: CPT | Performed by: OBSTETRICS & GYNECOLOGY

## 2023-09-25 PROCEDURE — 76805 OB US >/= 14 WKS SNGL FETUS: CPT | Performed by: OBSTETRICS & GYNECOLOGY

## 2023-09-26 ENCOUNTER — ROUTINE PRENATAL (OUTPATIENT)
Dept: OBGYN CLINIC | Age: 33
End: 2023-09-26

## 2023-09-26 VITALS
BODY MASS INDEX: 31.09 KG/M2 | WEIGHT: 170 LBS | DIASTOLIC BLOOD PRESSURE: 70 MMHG | HEART RATE: 88 BPM | SYSTOLIC BLOOD PRESSURE: 110 MMHG

## 2023-09-26 DIAGNOSIS — O99.332 TOBACCO SMOKING AFFECTING PREGNANCY IN SECOND TRIMESTER: ICD-10-CM

## 2023-09-26 DIAGNOSIS — A60.00 GENITAL HERPES SIMPLEX, UNSPECIFIED SITE: ICD-10-CM

## 2023-09-26 DIAGNOSIS — O09.92 HRP (HIGH RISK PREGNANCY), SECOND TRIMESTER: Primary | ICD-10-CM

## 2023-09-26 DIAGNOSIS — Z3A.30 30 WEEKS GESTATION OF PREGNANCY: ICD-10-CM

## 2023-09-26 DIAGNOSIS — F41.8 ANXIETY ASSOCIATED WITH DEPRESSION: ICD-10-CM

## 2023-09-26 DIAGNOSIS — I82.890 THROMBOSIS OF OVARIAN VEIN: ICD-10-CM

## 2023-09-26 PROCEDURE — 0502F SUBSEQUENT PRENATAL CARE: CPT | Performed by: CLINICAL NURSE SPECIALIST

## 2023-09-26 RX ORDER — CYANOCOBALAMIN/FOLIC AC/VIT B6 1-2.2-25MG
1 TABLET ORAL DAILY
Qty: 30 TABLET | Refills: 3 | Status: SHIPPED | OUTPATIENT
Start: 2023-09-26

## 2023-10-10 ENCOUNTER — ROUTINE PRENATAL (OUTPATIENT)
Dept: OBGYN CLINIC | Age: 33
End: 2023-10-10

## 2023-10-10 VITALS
SYSTOLIC BLOOD PRESSURE: 124 MMHG | WEIGHT: 171.8 LBS | BODY MASS INDEX: 31.42 KG/M2 | HEART RATE: 88 BPM | DIASTOLIC BLOOD PRESSURE: 82 MMHG

## 2023-10-10 DIAGNOSIS — Z3A.32 32 WEEKS GESTATION OF PREGNANCY: Primary | ICD-10-CM

## 2023-10-10 DIAGNOSIS — Z34.93 NORMAL PREGNANCY IN THIRD TRIMESTER: ICD-10-CM

## 2023-10-10 PROCEDURE — 0502F SUBSEQUENT PRENATAL CARE: CPT | Performed by: SPECIALIST

## 2023-10-10 NOTE — PROGRESS NOTES
disorder), combined type 05/05/2022     Priority: Medium    Adult BMI 27.0-27.9 kg/sq m 05/05/2022     Priority: Medium    Chronic nonintractable headache 05/05/2022     Priority: Medium    32 weeks gestation of pregnancy 10/10/2023    Normal pregnancy in third trimester 10/10/2023        Diagnosis Orders   1. 32 weeks gestation of pregnancy  US OB FOLLOW UP TRANSABDOMINAL APPROACH      2. Normal pregnancy in third trimester  US OB FOLLOW UP [address]          Patient doing well. She will be scheduled for growth ultrasound. Fetal heart rate ausculted at 146 bpm and fundal height is 32 cm. today. Counseling regarding flu and benefits of flu vaccine was provided to patient and patient was encouraged to get the vaccine. She was also encouraged to obtain Covid vaccination. Patient advised to continue taking prenatal vitamins and to rest as necessary. The patient will return to the office for her next visit in 2 weeks. See antepartum flow sheet. Ga Boogie am scribing for, and in the presence of Dr. Lore Wang. Electronically signed by: Sigrid Nava 10/10/23 4:26 PM   I agree to the above documentation placed by my scribe Sigrid Nava. I reviewed the scribe's note and agree with the documented findings and plan of care. Any areas of disagreement are noted on the chart. I have personally evaluated this patient. Additional findings are as noted. I agree with the chief complaint, past medical history, past surgical history, allergies, medications, social and family history as documented unless otherwise noted below.      Electronically signed by Lore Wang MD on 10/11/2023 at 4:59 PM

## 2023-10-24 ENCOUNTER — ROUTINE PRENATAL (OUTPATIENT)
Dept: OBGYN CLINIC | Age: 33
End: 2023-10-24

## 2023-10-24 VITALS
HEART RATE: 112 BPM | BODY MASS INDEX: 31.83 KG/M2 | SYSTOLIC BLOOD PRESSURE: 116 MMHG | WEIGHT: 174 LBS | DIASTOLIC BLOOD PRESSURE: 78 MMHG

## 2023-10-24 DIAGNOSIS — Z3A.34 34 WEEKS GESTATION OF PREGNANCY: ICD-10-CM

## 2023-10-24 DIAGNOSIS — Z34.93 NORMAL PREGNANCY IN THIRD TRIMESTER: Primary | ICD-10-CM

## 2023-10-24 PROBLEM — Z30.432 ENCOUNTER FOR IUD REMOVAL: Status: RESOLVED | Noted: 2022-10-17 | Resolved: 2023-10-24

## 2023-10-24 PROCEDURE — 0502F SUBSEQUENT PRENATAL CARE: CPT | Performed by: SPECIALIST

## 2023-10-24 NOTE — PROGRESS NOTES
Felix Marley is a 33 y.o. female 34w0d        OB History    Para Term  AB Living   4 3 2     3   SAB IAB Ectopic Molar Multiple Live Births             3      # Outcome Date GA Lbr Jose/2nd Weight Sex Delivery Anes PTL Lv   4 Current            3 Term 17 40w1d   M Vag-Spont   CHRISTINA   2 Term 13 39w0d  3.685 kg (8 lb 2 oz) M Vag-Spont EPI N CHRISTINA      Birth Comments: pt had ovarian dvt after delivery    1 Para 11 39w0d  3.232 kg (7 lb 2 oz) M Vag-Spont EPI N CHRISTINA       Chief Complaint   Patient presents with    High Risk Pregnancy        Blood pressure 116/78, pulse (!) 112, weight 78.9 kg (174 lb), last menstrual period 2023, currently breastfeeding. The patient was seen and evaluated. There was positive fetal movements. No contractions or leakage of fluid. Signs and symptoms of  labor as well as labor were reviewed. The S/S of Pre-Eclampsia were reviewed with the patient in detail. She is to report any of these if they occur. She currently denies any of these. The patient had her 28 week labs completed. The patient was instructed on fetal kick counts and was given a kick sheet to complete every 8 hours. She was instructed that the baby should move at a minimum of ten times within one hour after a meal. The patient was instructed to lay down on her left side twenty minutes after eating and count movements for up to one hour with a target value of ten movements. She was instructed to notify the office if she did not make that target after two attempts or if after any attempt there was less than four movements. The patient reports that the targets have been made Yes.     Patient Active Problem List    Diagnosis Date Noted    Anxiety associated with depression 2022     Priority: Medium    Myofascial muscle pain 2022     Priority: Medium    ADHD (attention deficit hyperactivity disorder), combined type 2022     Priority: Medium    Adult BMI

## 2023-11-07 ENCOUNTER — ROUTINE PRENATAL (OUTPATIENT)
Dept: PERINATAL CARE | Age: 33
End: 2023-11-07
Payer: COMMERCIAL

## 2023-11-07 VITALS
HEART RATE: 83 BPM | BODY MASS INDEX: 32.02 KG/M2 | WEIGHT: 174 LBS | TEMPERATURE: 97.5 F | DIASTOLIC BLOOD PRESSURE: 75 MMHG | HEIGHT: 62 IN | RESPIRATION RATE: 16 BRPM | SYSTOLIC BLOOD PRESSURE: 128 MMHG

## 2023-11-07 DIAGNOSIS — O99.283 METHYLENETETRAHYDROFOLATE REDUCTASE DEFICIENCY AFFECTING PREGNANCY IN THIRD TRIMESTER (HCC): ICD-10-CM

## 2023-11-07 DIAGNOSIS — O99.810 ABNORMAL MATERNAL GLUCOSE TOLERANCE, ANTEPARTUM: Primary | ICD-10-CM

## 2023-11-07 DIAGNOSIS — O35.8XX0 SUSPECTED DAMAGE TO FETUS FROM DISEASE IN MOTHER, ANTEPARTUM CONDITION, SINGLE OR UNSPECIFIED FETUS: ICD-10-CM

## 2023-11-07 DIAGNOSIS — Z3A.36 36 WEEKS GESTATION OF PREGNANCY: ICD-10-CM

## 2023-11-07 DIAGNOSIS — E72.12 METHYLENETETRAHYDROFOLATE REDUCTASE DEFICIENCY AFFECTING PREGNANCY IN THIRD TRIMESTER (HCC): ICD-10-CM

## 2023-11-07 DIAGNOSIS — O99.330 TOBACCO USE DISORDER COMPLICATING PREGNANCY, CHILDBIRTH, OR PUERPERIUM, ANTEPARTUM: ICD-10-CM

## 2023-11-07 DIAGNOSIS — Z36.4 ULTRASOUND FOR ANTENATAL SCREENING FOR FETAL GROWTH RESTRICTION: ICD-10-CM

## 2023-11-07 PROBLEM — Z86.718 HX OF DEEP VENOUS THROMBOSIS: Status: ACTIVE | Noted: 2023-11-07

## 2023-11-07 PROBLEM — Z15.89 MTHFR MUTATION: Status: ACTIVE | Noted: 2023-11-07

## 2023-11-07 PROCEDURE — 76816 OB US FOLLOW-UP PER FETUS: CPT | Performed by: OBSTETRICS & GYNECOLOGY

## 2023-11-07 PROCEDURE — 99999 PR OFFICE/OUTPT VISIT,PROCEDURE ONLY: CPT | Performed by: OBSTETRICS & GYNECOLOGY

## 2023-11-07 PROCEDURE — 76819 FETAL BIOPHYS PROFIL W/O NST: CPT | Performed by: OBSTETRICS & GYNECOLOGY

## 2023-11-13 ENCOUNTER — ROUTINE PRENATAL (OUTPATIENT)
Dept: OBGYN CLINIC | Age: 33
End: 2023-11-13
Payer: COMMERCIAL

## 2023-11-13 ENCOUNTER — HOSPITAL ENCOUNTER (OUTPATIENT)
Age: 33
Setting detail: SPECIMEN
Discharge: HOME | End: 2023-11-13

## 2023-11-13 VITALS — WEIGHT: 172 LBS | BODY MASS INDEX: 31.45 KG/M2 | HEART RATE: 100 BPM

## 2023-11-13 DIAGNOSIS — Z86.19 HISTORY OF HERPES GENITALIS: ICD-10-CM

## 2023-11-13 DIAGNOSIS — Z15.89 MTHFR MUTATION: ICD-10-CM

## 2023-11-13 DIAGNOSIS — O09.93 SUPERVISION OF HIGH RISK PREGNANCY, ANTEPARTUM, THIRD TRIMESTER: Primary | ICD-10-CM

## 2023-11-13 DIAGNOSIS — O99.213 OBESITY AFFECTING PREGNANCY IN THIRD TRIMESTER, UNSPECIFIED OBESITY TYPE: ICD-10-CM

## 2023-11-13 DIAGNOSIS — Z86.718 HX OF DEEP VENOUS THROMBOSIS: ICD-10-CM

## 2023-11-13 DIAGNOSIS — Z3A.36 36 WEEKS GESTATION OF PREGNANCY: ICD-10-CM

## 2023-11-13 LAB
ACCELERATIONS > 10BPM: NORMAL
ACCELERATIONS > 15 BPM: NORMAL
ACOUSTIC STIMULATION: NORMAL
DECELERATIONS: NORMAL
FHR VARIABILITIES: NORMAL
NST ASSESSMENT: NORMAL
NST BASELINE: NORMAL
NST DURATION: NORMAL
NST INDICATIONS: NORMAL
NST LOCATIONS: NORMAL
NST READ BY: NORMAL
NST RETURN: NORMAL
UTERINE ACTIVITY: NORMAL

## 2023-11-13 PROCEDURE — 0502F SUBSEQUENT PRENATAL CARE: CPT | Performed by: CLINICAL NURSE SPECIALIST

## 2023-11-13 PROCEDURE — G8419 CALC BMI OUT NRM PARAM NOF/U: HCPCS | Performed by: CLINICAL NURSE SPECIALIST

## 2023-11-13 PROCEDURE — 4004F PT TOBACCO SCREEN RCVD TLK: CPT | Performed by: CLINICAL NURSE SPECIALIST

## 2023-11-13 PROCEDURE — 59025 FETAL NON-STRESS TEST: CPT | Performed by: CLINICAL NURSE SPECIALIST

## 2023-11-13 PROCEDURE — G8427 DOCREV CUR MEDS BY ELIG CLIN: HCPCS | Performed by: CLINICAL NURSE SPECIALIST

## 2023-11-13 PROCEDURE — G8484 FLU IMMUNIZE NO ADMIN: HCPCS | Performed by: CLINICAL NURSE SPECIALIST

## 2023-11-16 ENCOUNTER — ROUTINE PRENATAL (OUTPATIENT)
Dept: OBGYN CLINIC | Age: 33
End: 2023-11-16

## 2023-11-16 VITALS
BODY MASS INDEX: 31.27 KG/M2 | DIASTOLIC BLOOD PRESSURE: 64 MMHG | SYSTOLIC BLOOD PRESSURE: 112 MMHG | WEIGHT: 171 LBS | HEART RATE: 92 BPM

## 2023-11-16 DIAGNOSIS — Z15.89 MTHFR MUTATION: Primary | ICD-10-CM

## 2023-11-16 DIAGNOSIS — Z86.718 HX OF DEEP VENOUS THROMBOSIS: ICD-10-CM

## 2023-11-16 NOTE — PROGRESS NOTES
Lissa Carrasco is a 33 y.o. female 37w2d    [id number]    OB History    Para Term  AB Living   4 3 3     3   SAB IAB Ectopic Molar Multiple Live Births             3      # Outcome Date GA Lbr Jose/2nd Weight Sex Delivery Anes PTL Lv   4 Current            3 Term 17 40w1d 07:48 / 00:22 3.9 kg (8 lb 9.6 oz) M Vag-Spont EPI N CHRISTINA   2 Term 13 40w0d  3.685 kg (8 lb 2 oz) M Vag-Spont EPI N CHRISTINA      Birth Comments: Post partum ovarian vein thrombosis   1 Term 11 40w0d  3.43 kg (7 lb 9 oz) M Vag-Spont EPI N CHRISTINA       Vitals  BP: 112/64  Weight - Scale: 77.6 kg (171 lb)  Pulse: 92  Patient Position: Sitting  Albumin: Negative  Glucose: Negative      The patient was seen and evaluated. There was positive fetal movements. No contractions or leakage of fluid. Signs and symptoms of labor were reviewed. The S/S of Pre-Eclampsia were reviewed with the patient in detail. She is to report any of these if they occur. She currently denies any of these. The patient was instructed on fetal kick counts and was given a kick sheet to complete every 8 hours. She was instructed that the baby should move at a minimum of ten times within one hour after a meal. The patient was instructed to lay down on her left side twenty minutes after eating and count movements for up to one hour with a target value of ten movements. She was instructed to notify the office if she did not make that target after two attempts or if after any attempt there was less than four movements. The patient reports that the targets have been made Yes. CIPRIANO by TVUS:  12/3/23    High Risk Dx:   Hx of HSV, Post partum ovarian vein thrombosis , APA, Smoker, MTHFR, Fetal exposure SSRI,SNRI    PRENATAL LAB RESULTS:   Pre-pregnancy: BMI  Blood Type/Rh:  A+  Antibody Screen:  Negative  Hemoglobin, Hematocrit:  13.1 / 39.2  Rubella:  Immune  T.  Pallidum, IgG:  NR  Hepatitis B Surface Antigen:  NR  TSH:  0.78  HIV:  NR  Sickle

## 2023-11-17 LAB
MICROORGANISM SPEC CULT: NORMAL
SPECIMEN DESCRIPTION: NORMAL

## 2023-11-20 ENCOUNTER — ROUTINE PRENATAL (OUTPATIENT)
Dept: OBGYN CLINIC | Age: 33
End: 2023-11-20

## 2023-11-20 VITALS
BODY MASS INDEX: 31.67 KG/M2 | HEART RATE: 106 BPM | WEIGHT: 173.2 LBS | DIASTOLIC BLOOD PRESSURE: 72 MMHG | SYSTOLIC BLOOD PRESSURE: 118 MMHG

## 2023-11-20 DIAGNOSIS — Z15.89 MTHFR MUTATION: Primary | ICD-10-CM

## 2023-11-20 DIAGNOSIS — Z86.718 HX OF DEEP VENOUS THROMBOSIS: ICD-10-CM

## 2023-11-20 PROCEDURE — G8484 FLU IMMUNIZE NO ADMIN: HCPCS | Performed by: CLINICAL NURSE SPECIALIST

## 2023-11-20 PROCEDURE — 4004F PT TOBACCO SCREEN RCVD TLK: CPT | Performed by: CLINICAL NURSE SPECIALIST

## 2023-11-20 PROCEDURE — G8419 CALC BMI OUT NRM PARAM NOF/U: HCPCS | Performed by: CLINICAL NURSE SPECIALIST

## 2023-11-20 PROCEDURE — G8427 DOCREV CUR MEDS BY ELIG CLIN: HCPCS | Performed by: CLINICAL NURSE SPECIALIST

## 2023-11-20 PROCEDURE — 0502F SUBSEQUENT PRENATAL CARE: CPT | Performed by: CLINICAL NURSE SPECIALIST

## 2023-11-28 ENCOUNTER — HOSPITAL ENCOUNTER (INPATIENT)
Age: 33
LOS: 3 days | Discharge: HOME | End: 2023-12-01
Attending: OBSTETRICS & GYNECOLOGY | Admitting: OBSTETRICS & GYNECOLOGY
Payer: COMMERCIAL

## 2023-11-28 PROBLEM — Z3A.34 34 WEEKS GESTATION OF PREGNANCY: Status: RESOLVED | Noted: 2023-10-24 | Resolved: 2023-11-28

## 2023-11-28 PROBLEM — Z3A.32 32 WEEKS GESTATION OF PREGNANCY: Status: RESOLVED | Noted: 2023-10-10 | Resolved: 2023-11-28

## 2023-11-28 PROBLEM — Z3A.39 39 WEEKS GESTATION OF PREGNANCY: Status: ACTIVE | Noted: 2023-11-28

## 2023-11-28 LAB
ABO + RH BLD: NORMAL
ALBUMIN SERPL-MCNC: 3.5 G/DL (ref 3.5–5.2)
ALBUMIN/GLOB SERPL: 1.2 {RATIO} (ref 1–2.5)
ALP SERPL-CCNC: 104 U/L (ref 35–104)
ALT SERPL-CCNC: 10 U/L (ref 5–33)
AMPHET UR QL SCN: NEGATIVE
ANION GAP SERPL CALCULATED.3IONS-SCNC: 11 MMOL/L (ref 9–17)
ARM BAND NUMBER: NORMAL
AST SERPL-CCNC: 14 U/L
BARBITURATES UR QL SCN: NEGATIVE
BASOPHILS # BLD: 0.03 K/UL (ref 0–0.2)
BASOPHILS NFR BLD: 0 % (ref 0–2)
BENZODIAZ UR QL: NEGATIVE
BILIRUB SERPL-MCNC: 0.2 MG/DL (ref 0.3–1.2)
BLOOD BANK SAMPLE EXPIRATION: NORMAL
BLOOD GROUP ANTIBODIES SERPL: NEGATIVE
BUN SERPL-MCNC: 8 MG/DL (ref 6–20)
CALCIUM SERPL-MCNC: 10 MG/DL (ref 8.6–10.4)
CANNABINOIDS UR QL SCN: NEGATIVE
CHLORIDE SERPL-SCNC: 101 MMOL/L (ref 98–107)
CO2 SERPL-SCNC: 20 MMOL/L (ref 20–31)
COCAINE UR QL SCN: NEGATIVE
CREAT SERPL-MCNC: 0.6 MG/DL (ref 0.5–0.9)
CREAT UR-MCNC: 130.5 MG/DL (ref 28–217)
EOSINOPHIL # BLD: 0.09 K/UL (ref 0–0.44)
EOSINOPHILS RELATIVE PERCENT: 1 % (ref 1–4)
ERYTHROCYTE [DISTWIDTH] IN BLOOD BY AUTOMATED COUNT: 13.2 % (ref 11.8–14.4)
FENTANYL UR QL: NEGATIVE
GFR SERPL CREATININE-BSD FRML MDRD: >60 ML/MIN/1.73M2
GLUCOSE SERPL-MCNC: 93 MG/DL (ref 70–99)
HCT VFR BLD AUTO: 35.1 % (ref 36.3–47.1)
HCV AB SERPL QL IA: NONREACTIVE
HGB BLD-MCNC: 12.8 G/DL (ref 11.9–15.1)
IMM GRANULOCYTES # BLD AUTO: 0.05 K/UL (ref 0–0.3)
IMM GRANULOCYTES NFR BLD: 1 %
LYMPHOCYTES NFR BLD: 1.97 K/UL (ref 1.1–3.7)
LYMPHOCYTES RELATIVE PERCENT: 19 % (ref 24–43)
MCH RBC QN AUTO: 33.8 PG (ref 25.2–33.5)
MCHC RBC AUTO-ENTMCNC: 36.5 G/DL (ref 28.4–34.8)
MCV RBC AUTO: 92.6 FL (ref 82.6–102.9)
METHADONE UR QL: NEGATIVE
MONOCYTES NFR BLD: 0.66 K/UL (ref 0.1–1.2)
MONOCYTES NFR BLD: 6 % (ref 3–12)
NEUTROPHILS NFR BLD: 73 % (ref 36–65)
NEUTS SEG NFR BLD: 7.79 K/UL (ref 1.5–8.1)
NRBC BLD-RTO: 0 PER 100 WBC
OPIATES UR QL SCN: NEGATIVE
OXYCODONE UR QL SCN: NEGATIVE
PCP UR QL SCN: NEGATIVE
PLATELET # BLD AUTO: 230 K/UL (ref 138–453)
PMV BLD AUTO: 11.3 FL (ref 8.1–13.5)
POTASSIUM SERPL-SCNC: 3.9 MMOL/L (ref 3.7–5.3)
PROT SERPL-MCNC: 6.5 G/DL (ref 6.4–8.3)
RBC # BLD AUTO: 3.79 M/UL (ref 3.95–5.11)
SODIUM SERPL-SCNC: 132 MMOL/L (ref 135–144)
T PALLIDUM AB SER QL IA: NONREACTIVE
TEST INFORMATION: NORMAL
TOTAL PROTEIN, URINE: 13 MG/DL
TSH SERPL DL<=0.05 MIU/L-ACNC: 1.04 UIU/ML (ref 0.3–5)
URINE TOTAL PROTEIN CREATININE RATIO: 0.1 (ref 0–0.2)
WBC OTHER # BLD: 10.6 K/UL (ref 3.5–11.3)

## 2023-11-28 PROCEDURE — 86780 TREPONEMA PALLIDUM: CPT

## 2023-11-28 PROCEDURE — 86850 RBC ANTIBODY SCREEN: CPT

## 2023-11-28 PROCEDURE — 86803 HEPATITIS C AB TEST: CPT

## 2023-11-28 PROCEDURE — 86901 BLOOD TYPING SEROLOGIC RH(D): CPT

## 2023-11-28 PROCEDURE — 36415 COLL VENOUS BLD VENIPUNCTURE: CPT

## 2023-11-28 PROCEDURE — 80307 DRUG TEST PRSMV CHEM ANLYZR: CPT

## 2023-11-28 PROCEDURE — 93005 ELECTROCARDIOGRAM TRACING: CPT

## 2023-11-28 PROCEDURE — 84156 ASSAY OF PROTEIN URINE: CPT

## 2023-11-28 PROCEDURE — 84443 ASSAY THYROID STIM HORMONE: CPT

## 2023-11-28 PROCEDURE — 2580000003 HC RX 258

## 2023-11-28 PROCEDURE — 86900 BLOOD TYPING SEROLOGIC ABO: CPT

## 2023-11-28 PROCEDURE — 6360000002 HC RX W HCPCS

## 2023-11-28 PROCEDURE — 87086 URINE CULTURE/COLONY COUNT: CPT

## 2023-11-28 PROCEDURE — 10907ZC DRAINAGE OF AMNIOTIC FLUID, THERAPEUTIC FROM PRODUCTS OF CONCEPTION, VIA NATURAL OR ARTIFICIAL OPENING: ICD-10-PCS | Performed by: OBSTETRICS & GYNECOLOGY

## 2023-11-28 PROCEDURE — 10H07YZ INSERTION OF OTHER DEVICE INTO PRODUCTS OF CONCEPTION, VIA NATURAL OR ARTIFICIAL OPENING: ICD-10-PCS | Performed by: OBSTETRICS & GYNECOLOGY

## 2023-11-28 PROCEDURE — 82570 ASSAY OF URINE CREATININE: CPT

## 2023-11-28 PROCEDURE — 3E033VJ INTRODUCTION OF OTHER HORMONE INTO PERIPHERAL VEIN, PERCUTANEOUS APPROACH: ICD-10-PCS | Performed by: OBSTETRICS & GYNECOLOGY

## 2023-11-28 PROCEDURE — 85025 COMPLETE CBC W/AUTO DIFF WBC: CPT

## 2023-11-28 PROCEDURE — 80053 COMPREHEN METABOLIC PANEL: CPT

## 2023-11-28 PROCEDURE — 1220000000 HC SEMI PRIVATE OB R&B

## 2023-11-28 PROCEDURE — 6370000000 HC RX 637 (ALT 250 FOR IP)

## 2023-11-28 RX ORDER — ACETAMINOPHEN 500 MG
1000 TABLET ORAL EVERY 6 HOURS PRN
Status: DISCONTINUED | OUTPATIENT
Start: 2023-11-28 | End: 2023-11-29

## 2023-11-28 RX ORDER — MORPHINE SULFATE 2 MG/ML
2 INJECTION, SOLUTION INTRAMUSCULAR; INTRAVENOUS ONCE
Status: COMPLETED | OUTPATIENT
Start: 2023-11-28 | End: 2023-11-28

## 2023-11-28 RX ORDER — PROCHLORPERAZINE EDISYLATE 5 MG/ML
10 INJECTION INTRAMUSCULAR; INTRAVENOUS ONCE
Status: COMPLETED | OUTPATIENT
Start: 2023-11-28 | End: 2023-11-28

## 2023-11-28 RX ORDER — MORPHINE SULFATE 10 MG/ML
8 INJECTION INTRAVENOUS ONCE
Status: COMPLETED | OUTPATIENT
Start: 2023-11-28 | End: 2023-11-28

## 2023-11-28 RX ORDER — DIPHENHYDRAMINE HCL 25 MG
25 TABLET ORAL EVERY 4 HOURS PRN
Status: DISCONTINUED | OUTPATIENT
Start: 2023-11-28 | End: 2023-11-29

## 2023-11-28 RX ORDER — SODIUM CHLORIDE, SODIUM LACTATE, POTASSIUM CHLORIDE, AND CALCIUM CHLORIDE .6; .31; .03; .02 G/100ML; G/100ML; G/100ML; G/100ML
1000 INJECTION, SOLUTION INTRAVENOUS PRN
Status: DISCONTINUED | OUTPATIENT
Start: 2023-11-28 | End: 2023-11-29

## 2023-11-28 RX ORDER — SODIUM CHLORIDE 0.9 % (FLUSH) 0.9 %
5-40 SYRINGE (ML) INJECTION PRN
Status: DISCONTINUED | OUTPATIENT
Start: 2023-11-28 | End: 2023-11-29

## 2023-11-28 RX ORDER — ACYCLOVIR 400 MG/1
400 TABLET ORAL 3 TIMES DAILY
Status: DISCONTINUED | OUTPATIENT
Start: 2023-11-28 | End: 2023-12-01 | Stop reason: HOSPADM

## 2023-11-28 RX ORDER — SODIUM CHLORIDE 0.9 % (FLUSH) 0.9 %
5-40 SYRINGE (ML) INJECTION EVERY 12 HOURS SCHEDULED
Status: DISCONTINUED | OUTPATIENT
Start: 2023-11-28 | End: 2023-11-29

## 2023-11-28 RX ORDER — SODIUM CHLORIDE, SODIUM LACTATE, POTASSIUM CHLORIDE, CALCIUM CHLORIDE 600; 310; 30; 20 MG/100ML; MG/100ML; MG/100ML; MG/100ML
INJECTION, SOLUTION INTRAVENOUS CONTINUOUS
Status: DISCONTINUED | OUTPATIENT
Start: 2023-11-28 | End: 2023-11-29

## 2023-11-28 RX ORDER — SODIUM CHLORIDE, SODIUM LACTATE, POTASSIUM CHLORIDE, AND CALCIUM CHLORIDE .6; .31; .03; .02 G/100ML; G/100ML; G/100ML; G/100ML
500 INJECTION, SOLUTION INTRAVENOUS PRN
Status: DISCONTINUED | OUTPATIENT
Start: 2023-11-28 | End: 2023-11-29

## 2023-11-28 RX ORDER — SODIUM CHLORIDE 9 MG/ML
INJECTION, SOLUTION INTRAVENOUS PRN
Status: DISCONTINUED | OUTPATIENT
Start: 2023-11-28 | End: 2023-11-29

## 2023-11-28 RX ADMIN — Medication 1 MILLI-UNITS/MIN: at 14:58

## 2023-11-28 RX ADMIN — SODIUM CHLORIDE, POTASSIUM CHLORIDE, SODIUM LACTATE AND CALCIUM CHLORIDE: 600; 310; 30; 20 INJECTION, SOLUTION INTRAVENOUS at 21:02

## 2023-11-28 RX ADMIN — PROCHLORPERAZINE EDISYLATE 10 MG: 5 INJECTION INTRAMUSCULAR; INTRAVENOUS at 14:35

## 2023-11-28 RX ADMIN — ACYCLOVIR 400 MG: 400 TABLET ORAL at 21:05

## 2023-11-28 RX ADMIN — SODIUM CHLORIDE, POTASSIUM CHLORIDE, SODIUM LACTATE AND CALCIUM CHLORIDE: 600; 310; 30; 20 INJECTION, SOLUTION INTRAVENOUS at 14:39

## 2023-11-28 RX ADMIN — ACYCLOVIR 400 MG: 400 TABLET ORAL at 14:25

## 2023-11-28 RX ADMIN — MORPHINE SULFATE 2 MG: 2 INJECTION, SOLUTION INTRAMUSCULAR; INTRAVENOUS at 14:45

## 2023-11-28 RX ADMIN — MORPHINE SULFATE 8 MG: 10 INJECTION INTRAVENOUS at 14:36

## 2023-11-28 NOTE — DISCHARGE SUMMARY
Obstetric Discharge Summary  [address]    Patient Name: Ethan Garcia  Patient : 1990  Primary Care Physician: Bacilio aCst MD  Admit Date: 2023    Principal Diagnosis: IUP at 39w0d, admitted for  RR IOL      Her pregnancy has been complicated by:   Patient Active Problem List   Diagnosis    Anxiety associated with depression    Myofascial muscle pain    ADHD (attention deficit hyperactivity disorder), combined type    Adult BMI 27.0-27.9 kg/sq m    Chronic nonintractable headache    MTHFR mutation    Hx of deep venous thrombosis    39 weeks gestation of pregnancy    gHTN (new dx)     23 M Apg 6/9 Wt 6#6       Infection Present?: No  Hospital Acquired: N/A    Surgical Operations & Procedures:  Analgesia: epidural  Delivery Type: Spontaneous Vaginal Delivery: See Labor and Delivery Summary   Laceration(s): Absent    Consultations: NICU and Anesthesia    Pertinent Findings & Procedures:   Ethan Garcia is a 33 y.o. female [id number] at 39w0d admitted for RR IOL ; received Pitocin, AROM (clr), acyclovir, morphine/compazine x1, IUPC, Nubain x1, Epidural.    Patient was noted to have elevated BP on admission. Patient did meet criteria for gHTN. PreE labs ordered wnl, P/C 0.10. Patient was also noted to be tachycardic; TSH wnl. EKG: NSR with PACs. She delivered by spontaneous vaginal a Live Born infant on 23. Information for the patient's :  Lorenzo Moore [[id number]]   male   Birth Weight: 2.915 kg (6 lb 6.8 oz)     Apgars: 6 at 1 minute and 9 at 5 minutes. Postpartum course:   PPD #1: Hgb 10.6. Patient agreeable to start lovenox. Ordered to begin today.      Course of patient: complicated by gestational HTN    Discharge to: Home    Readmission planned: no     Indication for 6 week PP 2 hour GTT?: no     Recommendations on Discharge:     Medications:      Medication List        START taking these medications      acetaminophen

## 2023-11-28 NOTE — CARE COORDINATION
ANTEPARTUM NOTE    39 weeks gestation of pregnancy [Z3A.39]    Felicia Goldstein was admitted to L&D on 11/28/2023 for RR IOL @ 39 0/7    OB GYN Provider: [facility] Judy Figueroa    Will meet with patient after delivery to verify name/address/phone/insurance and discuss discharge planning. Anticipate DC home 2 nights after vaginal delivery or 4 nights after C/S delivery as long as hemodynamically stable.

## 2023-11-28 NOTE — H&P
OBSTETRICAL HISTORY [address]    Date: 2023       Time: 12:42 PM   Patient Name: Brianna Duggan     Patient : 1990  Room/Bed: [facility]0702-    Admission Date/Time: 2023 12:09 PM      CC: Risk Reducing Induction of Labor      HPI: Brianna Duggan is a 33 y.o.  at 39w0d who presents for risk reducing induction of labor. The patient reports fetal movement is present, denies contractions, denies loss of fluid, denies vaginal bleeding. Patient denies headache, vision changes, nausea, vomiting, fever, chills, shortness of breath, chest pain, RUQ pain, abdominal pain, diarrhea, change in color/amount/odor of vaginal discharge, dysuria or, hematuria. DATING:  LMP: Patient's last menstrual period was 2023 (exact date).   Estimated Date of Delivery: 23   Based on: LMP, confirmed by US at 7 3/7 weeks GA    PREGNANCY RISK FACTORS:  Patient Active Problem List   Diagnosis    Anxiety associated with depression    Myofascial muscle pain    ADHD (attention deficit hyperactivity disorder), combined type    Adult BMI 27.0-27.9 kg/sq m    Chronic nonintractable headache    Normal pregnancy in third trimester    MTHFR mutation    Hx of deep venous thrombosis    39 weeks gestation of pregnancy        Steroids Given In This Pregnancy:  no     REVIEW OF SYSTEMS:   Constitutional: negative fever, negative chills, negative weight changes   HEENT: negative visual disturbances, negative headaches, negative dizziness, negative hearing loss  Breast: Negative breast abnormalities, negative breast lumps, negative nipple discharge  Respiratory: negative dyspnea, negative cough, negative SOB  Cardiovascular: negative chest pain,  negative palpitations, negative arrhythmia, negative syncope   Gastrointestinal: negative abdominal pain, negative RUQ pain, negative N/V, negative diarrhea, negative constipation, negative bowel changes, negative heartburn meatus: normal appearing   Vulva: Normal hair distribution, normal appearing vulva, no masses, tenderness or lesions, normal clitoris   Vagina: Normal appearing vaginal mucosa without lesions, no vaginal discharge noted in the posterior vault, no lacerations   Cervix: Normal appearing cervix without lesions, external os visibly dilated, no lacerations or abnormal lesions visualized  Sterile Vaginal Exam:  Cervix: No cervical motion tenderness   Uterus: Is gravid, Normal size, shape, consistency and non-tender   Adnexa: Non-tender, no palpable masses  Cervix: 1 cm dilated, 50 % effaced, -2 station, mid position (out of 3 station), medium consistency, FETAL POSITION: Cephalic (confirmed by ultrasound), Membranes intact,    Bishops Score: 5     0 1 2 3   Position Posterior Intermediate Anterior -   Consistency Firm Intermediate Soft -   Effacement 0-30% 31-50% 51-80% >80%   Dilation 0cm 1-2cm 3-4cm >5cm   Fetal Station -3 -2 -1, 0 +1, +2     LIMITED BEDSIDE US:  Position: Cephalic  Placental Location: anterior  Fetal Heart Tones: Present  Fetal Movement: Present   Amniotic Fluid Index/Volume:  adequate 2x2 cm fluid pocket  Estimated Fetal Weight:  8 lbs 0oz    PRENATAL LAB RESULTS:   Blood Type/Rh: A pos  Antibody Screen: negative  Hemoglobin, Hematocrit, Platelets: 51.1/46.7/654  Rubella: immune  T.  Pallidum, IgG: non-reactive  Hepatitis B Surface Antigen: non-reactive   Hepatitis C Antibody: not done   HIV: negative   Gonorrhea: negative  Chlamydia: negative  Urine culture: not done    1 hour Glucose Tolerance Test:  138  3 hour Glucose Tolerance Test: Fasting 88/ 1hr 155/ 2 hr 127/ 3 hr 82    Group B Strep: negative on 11/13/23  Cystic Fibrosis Screen: negative  Sickle Cell Screen: negative  First Trimester Screen: not done  QUAD: not done  MSAFP: negative  Non-Invasive Prenatal Testing: low risk for aneuploidy  Anatomy US: anterior placenta, 3VC, male gender, normal anatomy    ASSESSMENT & PLAN:  Sujit Pendleton

## 2023-11-29 ENCOUNTER — ANESTHESIA (OUTPATIENT)
Dept: LABOR AND DELIVERY | Age: 33
End: 2023-11-29
Payer: COMMERCIAL

## 2023-11-29 ENCOUNTER — ANESTHESIA EVENT (OUTPATIENT)
Dept: LABOR AND DELIVERY | Age: 33
End: 2023-11-29
Payer: COMMERCIAL

## 2023-11-29 PROBLEM — O13.9 GESTATIONAL HYPERTENSION: Status: ACTIVE | Noted: 2023-11-29

## 2023-11-29 LAB
EKG ATRIAL RATE: 74 BPM
EKG P AXIS: 52 DEGREES
EKG P-R INTERVAL: 132 MS
EKG Q-T INTERVAL: 368 MS
EKG QRS DURATION: 78 MS
EKG QTC CALCULATION (BAZETT): 408 MS
EKG R AXIS: 59 DEGREES
EKG T AXIS: 36 DEGREES
EKG VENTRICULAR RATE: 74 BPM
MICROORGANISM SPEC CULT: NORMAL
SPECIMEN DESCRIPTION: NORMAL

## 2023-11-29 PROCEDURE — 93010 ELECTROCARDIOGRAM REPORT: CPT | Performed by: INTERNAL MEDICINE

## 2023-11-29 PROCEDURE — 88307 TISSUE EXAM BY PATHOLOGIST: CPT

## 2023-11-29 PROCEDURE — 2500000003 HC RX 250 WO HCPCS: Performed by: NURSE ANESTHETIST, CERTIFIED REGISTERED

## 2023-11-29 PROCEDURE — 1220000000 HC SEMI PRIVATE OB R&B

## 2023-11-29 PROCEDURE — 6370000000 HC RX 637 (ALT 250 FOR IP)

## 2023-11-29 PROCEDURE — 3700000025 EPIDURAL BLOCK: Performed by: STUDENT IN AN ORGANIZED HEALTH CARE EDUCATION/TRAINING PROGRAM

## 2023-11-29 PROCEDURE — 2580000003 HC RX 258

## 2023-11-29 PROCEDURE — 59400 OBSTETRICAL CARE: CPT | Performed by: OBSTETRICS & GYNECOLOGY

## 2023-11-29 PROCEDURE — 6360000002 HC RX W HCPCS

## 2023-11-29 PROCEDURE — 6360000002 HC RX W HCPCS: Performed by: ANESTHESIOLOGY

## 2023-11-29 PROCEDURE — 7200000001 HC VAGINAL DELIVERY

## 2023-11-29 PROCEDURE — 6360000002 HC RX W HCPCS: Performed by: NURSE ANESTHETIST, CERTIFIED REGISTERED

## 2023-11-29 RX ORDER — ONDANSETRON 2 MG/ML
4 INJECTION INTRAMUSCULAR; INTRAVENOUS EVERY 6 HOURS PRN
Status: DISCONTINUED | OUTPATIENT
Start: 2023-11-29 | End: 2023-12-01 | Stop reason: HOSPADM

## 2023-11-29 RX ORDER — NALBUPHINE HYDROCHLORIDE 10 MG/ML
10 INJECTION, SOLUTION INTRAMUSCULAR; INTRAVENOUS; SUBCUTANEOUS ONCE
Status: COMPLETED | OUTPATIENT
Start: 2023-11-29 | End: 2023-11-29

## 2023-11-29 RX ORDER — LIDOCAINE HYDROCHLORIDE 10 MG/ML
INJECTION, SOLUTION INFILTRATION; PERINEURAL PRN
Status: DISCONTINUED | OUTPATIENT
Start: 2023-11-29 | End: 2023-11-29 | Stop reason: SDUPTHER

## 2023-11-29 RX ORDER — ENOXAPARIN SODIUM 100 MG/ML
40 INJECTION SUBCUTANEOUS DAILY
Qty: 16.4 ML | Refills: 0 | Status: SHIPPED | OUTPATIENT
Start: 2023-11-30 | End: 2023-12-01 | Stop reason: SDUPTHER

## 2023-11-29 RX ORDER — ENOXAPARIN SODIUM 100 MG/ML
40 INJECTION SUBCUTANEOUS DAILY
Status: DISCONTINUED | OUTPATIENT
Start: 2023-11-30 | End: 2023-12-01 | Stop reason: HOSPADM

## 2023-11-29 RX ORDER — LIDOCAINE HYDROCHLORIDE AND EPINEPHRINE 15; 5 MG/ML; UG/ML
INJECTION, SOLUTION EPIDURAL PRN
Status: DISCONTINUED | OUTPATIENT
Start: 2023-11-29 | End: 2023-11-29 | Stop reason: SDUPTHER

## 2023-11-29 RX ORDER — EPHEDRINE SULFATE 50 MG/ML
10 INJECTION INTRAVENOUS PRN
Status: DISCONTINUED | OUTPATIENT
Start: 2023-11-29 | End: 2023-11-29

## 2023-11-29 RX ORDER — ACETAMINOPHEN 500 MG
1000 TABLET ORAL EVERY 6 HOURS PRN
Qty: 60 TABLET | Refills: 1 | Status: SHIPPED | OUTPATIENT
Start: 2023-11-29

## 2023-11-29 RX ORDER — ROPIVACAINE HYDROCHLORIDE 2 MG/ML
INJECTION, SOLUTION EPIDURAL; INFILTRATION; PERINEURAL PRN
Status: DISCONTINUED | OUTPATIENT
Start: 2023-11-29 | End: 2023-11-29 | Stop reason: SDUPTHER

## 2023-11-29 RX ORDER — SODIUM CHLORIDE 0.9 % (FLUSH) 0.9 %
5-40 SYRINGE (ML) INJECTION PRN
Status: DISCONTINUED | OUTPATIENT
Start: 2023-11-29 | End: 2023-12-01 | Stop reason: HOSPADM

## 2023-11-29 RX ORDER — ONDANSETRON 2 MG/ML
4 INJECTION INTRAMUSCULAR; INTRAVENOUS EVERY 6 HOURS PRN
Status: DISCONTINUED | OUTPATIENT
Start: 2023-11-29 | End: 2023-11-29 | Stop reason: SDUPTHER

## 2023-11-29 RX ORDER — ONDANSETRON 4 MG/1
4 TABLET, ORALLY DISINTEGRATING ORAL EVERY 6 HOURS PRN
Status: DISCONTINUED | OUTPATIENT
Start: 2023-11-29 | End: 2023-12-01 | Stop reason: HOSPADM

## 2023-11-29 RX ORDER — LANOLIN 72 %
OINTMENT (GRAM) TOPICAL PRN
Status: DISCONTINUED | OUTPATIENT
Start: 2023-11-29 | End: 2023-12-01 | Stop reason: HOSPADM

## 2023-11-29 RX ORDER — IBUPROFEN 800 MG/1
800 TABLET ORAL EVERY 8 HOURS SCHEDULED
Status: DISCONTINUED | OUTPATIENT
Start: 2023-11-29 | End: 2023-12-01 | Stop reason: HOSPADM

## 2023-11-29 RX ORDER — IBUPROFEN 600 MG/1
600 TABLET ORAL EVERY 6 HOURS PRN
Qty: 40 TABLET | Refills: 0 | Status: SHIPPED | OUTPATIENT
Start: 2023-11-29

## 2023-11-29 RX ORDER — SODIUM CHLORIDE 0.9 % (FLUSH) 0.9 %
5-40 SYRINGE (ML) INJECTION EVERY 12 HOURS SCHEDULED
Status: DISCONTINUED | OUTPATIENT
Start: 2023-11-29 | End: 2023-12-01 | Stop reason: HOSPADM

## 2023-11-29 RX ORDER — SENNA AND DOCUSATE SODIUM 50; 8.6 MG/1; MG/1
1 TABLET, FILM COATED ORAL DAILY
Qty: 30 TABLET | Refills: 1 | Status: SHIPPED | OUTPATIENT
Start: 2023-11-29

## 2023-11-29 RX ORDER — ACETAMINOPHEN 500 MG
1000 TABLET ORAL EVERY 6 HOURS PRN
Status: DISCONTINUED | OUTPATIENT
Start: 2023-11-29 | End: 2023-12-01 | Stop reason: HOSPADM

## 2023-11-29 RX ORDER — DOCUSATE SODIUM 100 MG/1
100 CAPSULE, LIQUID FILLED ORAL 2 TIMES DAILY
Status: DISCONTINUED | OUTPATIENT
Start: 2023-11-29 | End: 2023-12-01 | Stop reason: HOSPADM

## 2023-11-29 RX ORDER — SODIUM CHLORIDE 9 MG/ML
INJECTION, SOLUTION INTRAVENOUS PRN
Status: DISCONTINUED | OUTPATIENT
Start: 2023-11-29 | End: 2023-12-01 | Stop reason: HOSPADM

## 2023-11-29 RX ORDER — NALOXONE HYDROCHLORIDE 0.4 MG/ML
INJECTION, SOLUTION INTRAMUSCULAR; INTRAVENOUS; SUBCUTANEOUS PRN
Status: DISCONTINUED | OUTPATIENT
Start: 2023-11-29 | End: 2023-11-29

## 2023-11-29 RX ORDER — ROPIVACAINE HYDROCHLORIDE 2 MG/ML
INJECTION, SOLUTION EPIDURAL; INFILTRATION; PERINEURAL
Status: COMPLETED
Start: 2023-11-29 | End: 2023-11-29

## 2023-11-29 RX ADMIN — SODIUM CHLORIDE, POTASSIUM CHLORIDE, SODIUM LACTATE AND CALCIUM CHLORIDE: 600; 310; 30; 20 INJECTION, SOLUTION INTRAVENOUS at 01:29

## 2023-11-29 RX ADMIN — Medication 10 MG: at 02:02

## 2023-11-29 RX ADMIN — IBUPROFEN 800 MG: 800 TABLET, FILM COATED ORAL at 11:30

## 2023-11-29 RX ADMIN — IBUPROFEN 800 MG: 800 TABLET, FILM COATED ORAL at 19:09

## 2023-11-29 RX ADMIN — ACYCLOVIR 400 MG: 400 TABLET ORAL at 08:46

## 2023-11-29 RX ADMIN — ROPIVACAINE HYDROCHLORIDE 10 ML/HR: 2 INJECTION, SOLUTION EPIDURAL; INFILTRATION at 06:24

## 2023-11-29 RX ADMIN — ROPIVACAINE HYDROCHLORIDE 5 ML: 2 INJECTION, SOLUTION EPIDURAL; INFILTRATION; PERINEURAL at 06:20

## 2023-11-29 RX ADMIN — ACYCLOVIR 400 MG: 400 TABLET ORAL at 19:09

## 2023-11-29 RX ADMIN — ACETAMINOPHEN 1000 MG: 500 TABLET ORAL at 22:42

## 2023-11-29 RX ADMIN — LIDOCAINE HYDROCHLORIDE,EPINEPHRINE BITARTRATE 3 ML: 15; .005 INJECTION, SOLUTION EPIDURAL; INFILTRATION; INTRACAUDAL; PERINEURAL at 06:16

## 2023-11-29 RX ADMIN — LIDOCAINE HYDROCHLORIDE,EPINEPHRINE BITARTRATE 2 ML: 15; .005 INJECTION, SOLUTION EPIDURAL; INFILTRATION; INTRACAUDAL; PERINEURAL at 06:18

## 2023-11-29 RX ADMIN — LIDOCAINE HYDROCHLORIDE 33 ML: 10 INJECTION, SOLUTION INFILTRATION; PERINEURAL at 06:12

## 2023-11-29 RX ADMIN — Medication 166.7 ML: at 10:07

## 2023-11-29 RX ADMIN — Medication 87.3 MILLI-UNITS/MIN: at 10:20

## 2023-11-29 RX ADMIN — DOCUSATE SODIUM 100 MG: 100 CAPSULE, LIQUID FILLED ORAL at 19:09

## 2023-11-29 ASSESSMENT — PAIN SCALES - GENERAL
PAINLEVEL_OUTOF10: 1
PAINLEVEL_OUTOF10: 0
PAINLEVEL_OUTOF10: 4
PAINLEVEL_OUTOF10: 5
PAINLEVEL_OUTOF10: 5

## 2023-11-29 ASSESSMENT — PAIN DESCRIPTION - DESCRIPTORS
DESCRIPTORS: JABBING
DESCRIPTORS: ACHING

## 2023-11-29 ASSESSMENT — PAIN DESCRIPTION - LOCATION
LOCATION: BACK
LOCATION: ABDOMEN;BACK
LOCATION: LEG

## 2023-11-29 NOTE — CARE COORDINATION
Social Work     Sw reviewed medical record (current active problem list) and tox screens and found no current concerns. Sw spoke with mom briefly to explain Sw role, inquire if any needs or concerns, and provide safe sleep education and discuss. Mom denied any needs or questions and informs baby has a safe sleep environment (bassinet)     Mom denied the need for community resources of referrals. Mom denied any current s/s of anxiety or depression and is aware to reach out to OB if any s/s occur after dc. Mom reports a really good support system and denied any current questions or needs. Moms support sytem consists of DAVID Maria E Briceno), her mom and her sisters. Mom reports this is her 5th baby. Mom also has children who are 12,11,10,and 6. Mom resides with FOSYLVESTER and her children. Mom states ped will be Dr. Raheem Clinton in [place]. Sw encouraged mom to reach out if any issues or concerns arise.       Documented by sw intern Mary Cutler

## 2023-11-29 NOTE — PLAN OF CARE
Problem: Vaginal Birth or  Section  Goal: Fetal and maternal status remain reassuring during the birth process  Description:  Birth OB-Pregnancy care plan goal which identifies if the fetal and maternal status remain reassuring during the birth process  Outcome: Completed     Problem: Pain  Goal: Verbalizes/displays adequate comfort level or baseline comfort level  Outcome: Completed     Problem: Infection - Adult  Goal: Absence of infection at discharge  Outcome: Completed  Goal: Absence of infection during hospitalization  Outcome: Completed  Goal: Absence of fever/infection during anticipated neutropenic period  Outcome: Completed     Problem: Safety - Adult  Goal: Free from fall injury  Outcome: Completed

## 2023-11-29 NOTE — CONSULTS
Breastfeeding education given and reviewed with pt. Pt states baby fed well in recovery, 15 minutes per side, strong sustained suckles and comfortable. Reviewed normal  feeding patterns, hunger cues, and offering the breast a minimum of every 3 hours. Reviewed hand expression of colostrum if baby won't latch to breast. Pt states she feels very comfortable breastfeeding as she  her 3 other sons. Encouraged pt to follow up with lactation as needed and call out for assistance.

## 2023-11-29 NOTE — CARE COORDINATION
CASE MANAGEMENT POST-PARTUM TRANSITIONAL CARE PLAN    39 weeks gestation of pregnancy [Z3A.39]    OB Provider: Jeanie BONILLA    Writer met w/ Ligia Vazquez at her bedside to discuss DCP. She is S/P  on 2023    Writer verified address/phone number correct on facesheet. She states she lives with FOB Marlene Quiroz and their 4 boys. Ligia Vazquez verbalized no difficulties with transportation to and from doctors appointments or with paying for medications upon discharge home. Generic commercial insurance verified primary and secondary is [email or web address]. Baby will be [facility] to the [facility]. Writer notified Ligia Vazquez she has 30 days from date of birth to add  to insurance policy. Liz verbalized understanding. She confirmed a safe place for infant to sleep at home. Infant name on BC: Olena Tsai (undecided middle name) Alina Ceron. Infant PCP undecided. Declined list.     DME: no  HOME CARE: no    Anticipate DC of mother and infant 1-2 days after .      Readmission Risk              Risk of Unplanned Readmission:  5

## 2023-11-29 NOTE — ANESTHESIA POSTPROCEDURE EVALUATION
Department of Anesthesiology  Postprocedure Note    Patient: Brianna Duggan  MRN: [id number]  YOB: 1990  Date of evaluation: 11/29/2023      Procedure Summary     Date: 11/29/23 Room / Location:     Anesthesia Start: 0586 Anesthesia Stop: 1001    Procedure: Labor Analgesia Diagnosis:     Scheduled Providers:  Responsible Provider: Lavinia Gamble MD    Anesthesia Type: epidural ASA Status: 2          Anesthesia Type: No value filed.     Rodríguez Phase I:      Rodríguez Phase II:        Anesthesia Post Evaluation    Patient location during evaluation: floor  Patient participation: complete - patient participated  Level of consciousness: awake  Pain score: 0  Airway patency: patent  Nausea & Vomiting: no vomiting and no nausea  Complications: no  Cardiovascular status: blood pressure returned to baseline  Respiratory status: acceptable  Hydration status: stable  Pain management: adequate

## 2023-11-29 NOTE — L&D DELIVERY NOTE
[facility] Pool [[id number]]      Labor Events      Cervical Ripening Date/Time:        Rupture Date/Time:  23 14:50:00   Rupture Type: AROM  Fluid Color: Clear  Fluid Odor: None              Delivery Details      Delivery Date: 23 Delivery Time: 10:01:00   Delivery Type: Vaginal, Spontaneous              Shoulder Dystocia    Shoulder Dystocia Present?: No                                                                                                           Assisted Delivery Details    Forceps Attempted?: No  Vacuum Extractor Attempted?: No                                                             Cord    Vessels: 3 Vessels  Complications: Nuchal Loose  Cord Around: Head  Delayed Cord Clamping?: No  Cord Clamped Date/Time: 2023 10:01:00              Placenta    Date/Time: 2023 10:06:00  Removal: Spontaneous       Lacerations    Episiotomy: None  Other Lacerations: no non-perineal laceration       Blood Loss  Mother: Yariel Jj #[id number]     Start of Mother's Information      Delivery Blood Loss  23 2201 - 23 1027      None                 End of Mother's Information  Mother: Yariel Jj #[id number]                Delivery Providers    Delivering clinician: Kishor Gonslaes DO     Provider Role    Pb Galindo DO Obstetrician     Primary Nurse     Primary [place] Nurse     NICU Nurse     Neonatologist     Anesthesiologist     Nurse Anesthetist     Nurse Practitioner     Midwife     Nursery Nurse    Guillermo Hollingsworth, DO Resident              [place] Assessment          Skin Color:   Heart Rate:   Reflex Irritability:   Muscle Tone:   Respiratory Effort: Total:            1 Minute:    0    2    2    1    1    6         5 Minute:    1    2    2    2    2    9                                        Apgars Assigned By: Dain TUTTLE RN              Resuscitation    Method: Bulb Suction, Room Air, Stimulation, CPAP             [place] Measurements      Birth Weight: 1848

## 2023-11-29 NOTE — ANESTHESIA PROCEDURE NOTES
Epidural Block    Patient location during procedure: OB  Reason for block: labor epidural  Staffing  Performed: resident/CRNA   Anesthesiologist: Cb Browne MD  Resident/CRNA: IRENE Kyle CRNA  Performed by: IRENE Kyle CRNA  Authorized by: Cb Browne MD    Epidural  Patient position: sitting  Prep: Betadine  Patient monitoring: continuous pulse ox and frequent blood pressure checks  Approach: midline  Location: L3-4  Injection technique: MAGGIE air and MAGGIE saline  Provider prep: mask and sterile gloves  Needle  Needle type: Tuohy   Needle gauge: 17 G  Needle length: 3.5 in  Needle insertion depth: 5 cm  Catheter type: side hole  Catheter size: 19 G  Catheter at skin depth: 11 cm  Test dose: negativeCatheter Secured: tegaderm and tape  Assessment  Sensory level: T10  Hemodynamics: stable  Attempts: 1  Outcomes: uncomplicated and patient tolerated procedure well  Preanesthetic Checklist  Completed: patient identified, IV checked, risks and benefits discussed, surgical/procedural consents, equipment checked, pre-op evaluation, timeout performed, anesthesia consent given, oxygen available, monitors applied/VS acknowledged, fire risk safety assessment completed and verbalized and blood product R/B/A discussed and consented

## 2023-11-29 NOTE — PROGRESS NOTES
Labor Progress Note    Capri Serna is a 33 y.o. female [id number] at 39w1d    Resident to bedside per RN request for [facility]. FHT noted to have 3 min prolonged deceleration to 70s. When writer entered the room FHT returned to baseline 118s. The patient was seen and examined. Her pain is well controlled with epidural. She reports fetal movement is present, complains of contractions, denies loss of fluid, denies vaginal bleeding. Vital Signs:  Vitals:    11/29/23 0630 11/29/23 0636 11/29/23 0701 11/29/23 0732   BP: (!) 93/44 110/62 113/69 121/61   Pulse: 89 83 89 86   Resp:       Temp:       TempSrc:       SpO2: 97% 97% 99%    Weight:       Height:             FHT: 120, moderate variability, accelerations present, decelerations prolonged x 3 min to 70s, maternal repositioning resolved. Pitocin off.    Contractions: regular, every 2-3 minutes    Chaperone for Intimate Exam: Chaperone was present for entire exam, Chaperone Name: ULISES JAY  Cervical Exam: 6 cm dilated, 70 effaced, 0 station  Pitocin: @ 0 mu/min    Membranes: Ruptured clear fluid  Scalp Electrode in place: absent  Intrauterine Pressure Catheter in Place: present    Interventions: SVE, Maternal Repositioning     Assessment/Plan:  Capri Serna is a 33 y.o. female [id number] at 39w1d admitted for RR IOL    - GBS negative, No indication for GBS prophylaxis   - VSS, Afebrile   - cEFM/TOCO    - SVE: 6/70/0   - Epidural in place and functioning    - IUPC in place    - Pitocin off at this time, will await for recovery of FHT, pitocin to be turned back on.   - Dr. Miguel Del Rosario notified, en route to hospital.     Attending updated and in agreement with plan    Christin Leahy DO  Ob/Gyn Resident  11/29/2023, 8:11 AM

## 2023-11-30 LAB
HCT VFR BLD AUTO: 30.5 % (ref 36.3–47.1)
HGB BLD-MCNC: 10.6 G/DL (ref 11.9–15.1)

## 2023-11-30 PROCEDURE — 85014 HEMATOCRIT: CPT

## 2023-11-30 PROCEDURE — 2580000003 HC RX 258

## 2023-11-30 PROCEDURE — 36415 COLL VENOUS BLD VENIPUNCTURE: CPT

## 2023-11-30 PROCEDURE — 85018 HEMOGLOBIN: CPT

## 2023-11-30 PROCEDURE — 1220000000 HC SEMI PRIVATE OB R&B

## 2023-11-30 PROCEDURE — 6360000002 HC RX W HCPCS

## 2023-11-30 PROCEDURE — 6370000000 HC RX 637 (ALT 250 FOR IP)

## 2023-11-30 PROCEDURE — 99024 POSTOP FOLLOW-UP VISIT: CPT | Performed by: OBSTETRICS & GYNECOLOGY

## 2023-11-30 RX ADMIN — SODIUM CHLORIDE, PRESERVATIVE FREE 10 ML: 5 INJECTION INTRAVENOUS at 07:40

## 2023-11-30 RX ADMIN — ENOXAPARIN SODIUM 40 MG: 100 INJECTION SUBCUTANEOUS at 10:27

## 2023-11-30 RX ADMIN — ACETAMINOPHEN 1000 MG: 500 TABLET ORAL at 13:19

## 2023-11-30 RX ADMIN — ACYCLOVIR 400 MG: 400 TABLET ORAL at 07:28

## 2023-11-30 RX ADMIN — ACETAMINOPHEN 1000 MG: 500 TABLET ORAL at 07:28

## 2023-11-30 RX ADMIN — ACETAMINOPHEN 1000 MG: 500 TABLET ORAL at 21:02

## 2023-11-30 RX ADMIN — ACYCLOVIR 400 MG: 400 TABLET ORAL at 23:10

## 2023-11-30 RX ADMIN — IBUPROFEN 800 MG: 800 TABLET, FILM COATED ORAL at 23:10

## 2023-11-30 RX ADMIN — ACYCLOVIR 400 MG: 400 TABLET ORAL at 13:26

## 2023-11-30 RX ADMIN — IBUPROFEN 800 MG: 800 TABLET, FILM COATED ORAL at 16:12

## 2023-11-30 RX ADMIN — DOCUSATE SODIUM 100 MG: 100 CAPSULE, LIQUID FILLED ORAL at 07:28

## 2023-11-30 RX ADMIN — IBUPROFEN 800 MG: 800 TABLET, FILM COATED ORAL at 07:28

## 2023-11-30 ASSESSMENT — PAIN SCALES - GENERAL: PAINLEVEL_OUTOF10: 5

## 2023-11-30 ASSESSMENT — PAIN DESCRIPTION - LOCATION: LOCATION: ABDOMEN

## 2023-11-30 NOTE — LACTATION NOTE
Mom reports that pumping is going well and comfortably. She has pumped an increased volume of colostrum. Taught the settings on the Symphony breast pump. Encouraged to call out for assistance as needed.

## 2023-11-30 NOTE — PROGRESS NOTES
Obstetric/Gynecology Resident Interval Note    Notified by RN that patient has some right leg pain above the knee as well as questions regarding Lovenox PP for history of VTE. Upon entry to room patient is lying in bed comfortably in no acute distress. Patient reports her right inner thigh feels sore like it would after exercising and working out. Patient's strength and sensation is normal and has no tenderness on palpation. No signs of VTE at this time, likely due to pushing or positioning during labor and . Will continue to monitor closely. Discussed R/B/A of Lovenox daily injection for 6 weeks post partum and patient is amenable. Will plan to give first dose tomorrow and show patient how to do injection. Senior resident updated.     Montine Rinne, DO  OB/GYN Resident, PGY1  [address] [place] [address]  2023, 11:11 PM

## 2023-11-30 NOTE — PROGRESS NOTES
Initiation of Electric Breast Pumping     Pumping Initiated at [employer]    Initiated due to    []   Baby in NICU   []   Plans exclusive pumping   []   Infant weight loss(supplement)   []   Baby not latching well    Flange Size    Right:   Left:     []   24    []   24     [x]   27    [x]   27     []   30    []   30     []   36    []   36  Instructions   [x]   Verbal instructions on how to setup pump and how to use initiation phase   []   Written sheet\" How to keep your breast pump kit clean\"   [x]   Expectation sheet for Breastfeeding mothers with pumping log   [x]   Frequency of pumping   [x]   Collection,labeling and storage of colostrum and milk    Supplies Provided   [x]   Pump initiation kit   [x]   Cleaning supplies (basin and soap)   [x]   Additional flange size   [x]   Oral syringes/snappies   [x]   Patient labels       -

## 2023-12-01 VITALS
DIASTOLIC BLOOD PRESSURE: 86 MMHG | RESPIRATION RATE: 18 BRPM | BODY MASS INDEX: 31.83 KG/M2 | OXYGEN SATURATION: 100 % | WEIGHT: 173 LBS | TEMPERATURE: 98 F | HEART RATE: 92 BPM | HEIGHT: 62 IN | SYSTOLIC BLOOD PRESSURE: 120 MMHG

## 2023-12-01 PROBLEM — Z34.93 NORMAL PREGNANCY IN THIRD TRIMESTER: Status: RESOLVED | Noted: 2023-10-10 | Resolved: 2023-12-01

## 2023-12-01 PROCEDURE — 6370000000 HC RX 637 (ALT 250 FOR IP)

## 2023-12-01 PROCEDURE — 6360000002 HC RX W HCPCS

## 2023-12-01 PROCEDURE — 99024 POSTOP FOLLOW-UP VISIT: CPT | Performed by: OBSTETRICS & GYNECOLOGY

## 2023-12-01 RX ORDER — ENOXAPARIN SODIUM 100 MG/ML
40 INJECTION SUBCUTANEOUS DAILY
Qty: 16.4 ML | Refills: 0 | Status: SHIPPED | OUTPATIENT
Start: 2023-12-01 | End: 2024-01-11

## 2023-12-01 RX ADMIN — IBUPROFEN 800 MG: 800 TABLET, FILM COATED ORAL at 11:05

## 2023-12-01 RX ADMIN — DOCUSATE SODIUM 100 MG: 100 CAPSULE, LIQUID FILLED ORAL at 11:05

## 2023-12-01 RX ADMIN — ACYCLOVIR 400 MG: 400 TABLET ORAL at 11:05

## 2023-12-01 RX ADMIN — ENOXAPARIN SODIUM 40 MG: 100 INJECTION SUBCUTANEOUS at 11:05

## 2023-12-01 RX ADMIN — ACETAMINOPHEN 1000 MG: 500 TABLET ORAL at 06:37

## 2023-12-01 NOTE — PLAN OF CARE
Problem: Postpartum  Goal: Experiences normal postpartum course  Description:  Postpartum OB-Pregnancy care plan goal which identifies if the mother is experiencing a normal postpartum course  Outcome: Completed  Goal: Appropriate maternal -  bonding  Description:  Postpartum OB-Pregnancy care plan goal which identifies if the mother and  are bonding appropriately  Outcome: Completed  Goal: Establishment of infant feeding pattern  Description:  Postpartum OB-Pregnancy care plan goal which identifies if the mother is establishing a feeding pattern with their   Outcome: Completed  Goal: Incisions, wounds, or drain sites healing without S/S of infection  Outcome: Completed

## 2023-12-01 NOTE — PROGRESS NOTES
CLINICAL PHARMACY NOTE: MEDS TO BEDS    Total # of Prescriptions Filled: 4   The following medications were delivered to the patient:  Ibuprofen 600mg  Senokot 8.6-50mg  Acetaminophen 500mg  Enoxaparin 40mg/0.4ml    Additional Documentation: delivered to patient in room 744 12/1 at 10:59am. No co-pay.

## 2023-12-01 NOTE — FLOWSHEET NOTE
0600,   Boby Saha CRNA, at bedside. Epidural procedure explained, risks discussed. Pt verbalizes consent for epidural.   [id number] patient positioned for epidural. 0610 Time out completed. 0986 catheter placed. 8323 test dose given. Epidural catheter taped and secured per anesthesia. 3334 to low fowlers with left uterine displacement.
Dr Agueda Larson reviewed strip.
I have reviewed all [facility] Post-Birth Warning Signs and essential teaching points for pulmonary embolism, cardiac disease, hypertensive disorders of pregnancy, obstetric hemorrhage, venous thromboembolism, infection, and postpartum depression with the patient and FOB(support person) . I have informed the patient on when to call their healthcare provider and when to call 911. I have discussed with the patient  the importance of scheduling a follow-up visit with their physician, nurse practitioner or midwife and provided them with correct contact information for appointment. I have provided the patient with a copy of the \"Save Your Life\" handout. The patient has acknowledged receiving and understanding this education with her signature.
Patient actively pushing. RN and Dr. Dayna Dasilva at bedside. RN remains in continuous attendance at the bedside. Called out for resident. Assessment & evaluation of fetal heart rate ongoing via continuous EFM.
Patient admitted to room 744 from L&D via . Oriented to room and surroundings. Plan of care reviewed. Verbalized understanding. Instructed on infant security and safe sleep practices. Preventing falls education provided . The following handouts given: A New Beginning: Your Guide to Postpartum Care, Rounding, gs Security System,Babies Cry A lot, Safe Sleep, Security and Visitation Guidelines. Call light placed within reach.
Patient discharged home with FOB but courtesy stay due to baby in NICU ,all supplies provided wth Meds to Beds at bedside and instructions for self administration Lovenox SC Daily at home reviewed and patient demonstrated understanding.
24

## 2023-12-01 NOTE — LACTATION NOTE
Lactation round made. Patient reports pumping 15-23 ml per session. Unable to put baby to breast at this time in NICU. No questions or assistance needed at this time. Encouraged to call out.

## 2023-12-04 ENCOUNTER — POSTPARTUM VISIT (OUTPATIENT)
Dept: OBGYN CLINIC | Age: 33
End: 2023-12-04

## 2023-12-04 VITALS
BODY MASS INDEX: 30.91 KG/M2 | DIASTOLIC BLOOD PRESSURE: 76 MMHG | SYSTOLIC BLOOD PRESSURE: 120 MMHG | HEIGHT: 62 IN | WEIGHT: 168 LBS | HEART RATE: 85 BPM

## 2023-12-04 DIAGNOSIS — Z13.31 POSITIVE DEPRESSION SCREENING: ICD-10-CM

## 2023-12-04 LAB — SURGICAL PATHOLOGY REPORT: NORMAL

## 2023-12-04 PROCEDURE — 0503F POSTPARTUM CARE VISIT: CPT | Performed by: CLINICAL NURSE SPECIALIST

## 2023-12-04 PROCEDURE — 1111F DSCHRG MED/CURRENT MED MERGE: CPT | Performed by: CLINICAL NURSE SPECIALIST

## 2023-12-04 PROCEDURE — G8484 FLU IMMUNIZE NO ADMIN: HCPCS | Performed by: CLINICAL NURSE SPECIALIST

## 2023-12-04 PROCEDURE — 4004F PT TOBACCO SCREEN RCVD TLK: CPT | Performed by: CLINICAL NURSE SPECIALIST

## 2023-12-04 PROCEDURE — G8427 DOCREV CUR MEDS BY ELIG CLIN: HCPCS | Performed by: CLINICAL NURSE SPECIALIST

## 2023-12-04 PROCEDURE — G8417 CALC BMI ABV UP PARAM F/U: HCPCS | Performed by: CLINICAL NURSE SPECIALIST

## 2023-12-05 ENCOUNTER — HOSPITAL ENCOUNTER (OUTPATIENT)
Age: 33
Discharge: HOME | End: 2023-12-05
Payer: COMMERCIAL

## 2023-12-05 ENCOUNTER — TELEPHONE (OUTPATIENT)
Dept: OBGYN CLINIC | Age: 33
End: 2023-12-05

## 2023-12-05 DIAGNOSIS — Z13.79 GENETIC TESTING: ICD-10-CM

## 2023-12-05 LAB
SEND OUT REPORT: NORMAL
TEST NAME: NORMAL

## 2023-12-05 PROCEDURE — 36415 COLL VENOUS BLD VENIPUNCTURE: CPT

## 2023-12-05 NOTE — TELEPHONE ENCOUNTER
Patient instructed to get genetic testing done due to baby having issues. Writer spoke to Dr. Nicci Way for the correct testing. Order placed per Judy BONILLA CNP verbal orders.

## 2023-12-11 ENCOUNTER — POSTPARTUM VISIT (OUTPATIENT)
Dept: OBGYN CLINIC | Age: 33
End: 2023-12-11

## 2023-12-11 VITALS
DIASTOLIC BLOOD PRESSURE: 64 MMHG | SYSTOLIC BLOOD PRESSURE: 118 MMHG | WEIGHT: 162 LBS | BODY MASS INDEX: 29.81 KG/M2 | HEIGHT: 62 IN | HEART RATE: 86 BPM

## 2023-12-11 PROCEDURE — G8484 FLU IMMUNIZE NO ADMIN: HCPCS | Performed by: CLINICAL NURSE SPECIALIST

## 2023-12-11 PROCEDURE — G8427 DOCREV CUR MEDS BY ELIG CLIN: HCPCS | Performed by: CLINICAL NURSE SPECIALIST

## 2023-12-11 PROCEDURE — 1111F DSCHRG MED/CURRENT MED MERGE: CPT | Performed by: CLINICAL NURSE SPECIALIST

## 2023-12-11 PROCEDURE — 0503F POSTPARTUM CARE VISIT: CPT | Performed by: CLINICAL NURSE SPECIALIST

## 2023-12-11 PROCEDURE — 4004F PT TOBACCO SCREEN RCVD TLK: CPT | Performed by: CLINICAL NURSE SPECIALIST

## 2023-12-11 PROCEDURE — G8417 CALC BMI ABV UP PARAM F/U: HCPCS | Performed by: CLINICAL NURSE SPECIALIST

## 2023-12-11 ASSESSMENT — ENCOUNTER SYMPTOMS
RESPIRATORY NEGATIVE: 1
GASTROINTESTINAL NEGATIVE: 1
ALLERGIC/IMMUNOLOGIC NEGATIVE: 1
EYES NEGATIVE: 1

## 2023-12-11 NOTE — PROGRESS NOTES
Postpartum Visit: Patient is here today for postpartum vaginal delivery. I have fully reviewed the prenatal and intrapartum course. She is 2 weeks postpartum. Patient is breast feeding. Her bleeding is light. Patient's pain is 0 out of 10 on the pain scale. Patient is not sexually active. Current contraception method is abstinence. Postpartum depression screening questionnaire provided to patient and is negative.

## 2023-12-11 NOTE — PROGRESS NOTES
HPI    Isaac Lang is a 33 y.o. female  presents for her 2 week postpartum vaginal delivery visit. Patient reports that she is having regular bowel movements, and is urinating without difficulty. Patient states that her bleeding is light to moderate. Patient is breast feeding. Patient denies any mastitis. Patient denies any postpartum depression/baby blues, no suicidal, or homicidal thoughts, and has good support system. Patient reports that since baby has come home she is not having any postpartum depression. Patient denies any pain at this time. [id number]    OB History    Para Term  AB Living   4 4 4     4   SAB IAB Ectopic Molar Multiple Live Births           0 4      # Outcome Date GA Lbr Jose/2nd Weight Sex Delivery Anes PTL Lv   4 Term 23 39w1d  2.915 kg (6 lb 6.8 oz) M Vag-Spont EPI N CHRISTINA   3 Term 17 40w1d 07:48 / 00:22 3.9 kg (8 lb 9.6 oz) M Vag-Spont EPI N CHRISTINA   2 Term 13 40w0d  3.685 kg (8 lb 2 oz) M Vag-Spont EPI N CHRISTINA      Birth Comments: Post partum ovarian vein thrombosis   1 Term 11 40w0d  3.43 kg (7 lb 9 oz) M Vag-Spont EPI N CHRISTINA       Blood pressure 118/64, pulse 86, height 1.575 m (5' 2\"), weight 73.5 kg (162 lb), last menstrual period 2023, currently breastfeeding.         Patient Active Problem List    Diagnosis Date Noted    MTHFR mutation 2023     Priority: High    Hx of deep venous thrombosis 2023     Priority: High    Anxiety associated with depression 2022     Priority: Medium    Myofascial muscle pain 2022     Priority: Medium    ADHD (attention deficit hyperactivity disorder), combined type 2022     Priority: Medium    Adult BMI 27.0-27.9 kg/sq m 2022     Priority: Medium    Chronic nonintractable headache 2022     Priority: Medium    gHTN (new dx) 2023     Diagnosed during labor     PreE labs wnl, P/C 0.10 (on admission)       23 M Apg 6/9 Wt 6#6 2023    39 weeks

## 2024-01-17 LAB
SEND OUT REPORT: NORMAL
TEST NAME: NORMAL

## 2024-01-30 LAB
EKG ATRIAL RATE: 74 BPM
EKG P AXIS: 52 DEGREES
EKG P-R INTERVAL: 132 MS
EKG Q-T INTERVAL: 368 MS
EKG QRS DURATION: 78 MS
EKG QTC CALCULATION (BAZETT): 408 MS
EKG R AXIS: 59 DEGREES
EKG T AXIS: 36 DEGREES
EKG VENTRICULAR RATE: 74 BPM

## 2025-04-28 ENCOUNTER — OFFICE VISIT (OUTPATIENT)
Dept: OBGYN CLINIC | Age: 35
End: 2025-04-28
Payer: COMMERCIAL

## 2025-04-28 ENCOUNTER — HOSPITAL ENCOUNTER (OUTPATIENT)
Age: 35
Setting detail: SPECIMEN
Discharge: HOME | End: 2025-04-28

## 2025-04-28 VITALS
HEIGHT: 62 IN | DIASTOLIC BLOOD PRESSURE: 68 MMHG | WEIGHT: 154 LBS | HEART RATE: 94 BPM | SYSTOLIC BLOOD PRESSURE: 110 MMHG | BODY MASS INDEX: 28.34 KG/M2

## 2025-04-28 DIAGNOSIS — Z80.41 FAMILY HISTORY OF OVARIAN CANCER: ICD-10-CM

## 2025-04-28 DIAGNOSIS — Z01.419 WELL WOMAN EXAM: Primary | ICD-10-CM

## 2025-04-28 DIAGNOSIS — F17.200 SMOKER: ICD-10-CM

## 2025-04-28 DIAGNOSIS — Z11.51 SCREENING FOR HPV (HUMAN PAPILLOMAVIRUS): ICD-10-CM

## 2025-04-28 DIAGNOSIS — B02.8 HERPES ZOSTER WITH OTHER COMPLICATION: ICD-10-CM

## 2025-04-28 DIAGNOSIS — Z86.19 HISTORY OF HERPES GENITALIS: ICD-10-CM

## 2025-04-28 PROCEDURE — 4004F PT TOBACCO SCREEN RCVD TLK: CPT | Performed by: CLINICAL NURSE SPECIALIST

## 2025-04-28 PROCEDURE — 99213 OFFICE O/P EST LOW 20 MIN: CPT | Performed by: CLINICAL NURSE SPECIALIST

## 2025-04-28 PROCEDURE — G8427 DOCREV CUR MEDS BY ELIG CLIN: HCPCS | Performed by: CLINICAL NURSE SPECIALIST

## 2025-04-28 PROCEDURE — 99395 PREV VISIT EST AGE 18-39: CPT | Performed by: CLINICAL NURSE SPECIALIST

## 2025-04-28 PROCEDURE — G8417 CALC BMI ABV UP PARAM F/U: HCPCS | Performed by: CLINICAL NURSE SPECIALIST

## 2025-04-28 RX ORDER — VALACYCLOVIR HYDROCHLORIDE 500 MG/1
500 TABLET, FILM COATED ORAL 2 TIMES DAILY
Qty: 30 TABLET | Refills: 1 | Status: SHIPPED | OUTPATIENT
Start: 2025-04-28

## 2025-04-28 NOTE — PROGRESS NOTES
[facility] OBSTETRICS & GYNECOLOGY  [address]  [place] [address]  Dept: [phone]        DATE OF VISIT:  25        History and Physical    Liz Barr    :  1990  CHIEF COMPLAINT:    Chief Complaint   Patient presents with    Annual Exam                    Liz Barr is a 34 y.o. female who presents for annual well woman exam.  Patient is requesting refills for valtrex.  Patient reports that currently her mother has ovarian CA and is requesting BRCA screening.  Cluster of blisters on lower left ankle.     The patient was seen and examined.  Per the patient bowels are regular. She has no voiding complaints.  She denies any bloating as well as vaginal discharge.    Chaperone for Intimate Exam  Chaperone was offered as part of the rooming process. Patient declined and agrees to continue with exam without a chaperone.  Chaperone: none     _____________________________________________________________________  Past Medical History:   Diagnosis Date    ADHD (attention deficit hyperactivity disorder), combined type 2022    Anxiety 2022    Compound heterozygous MTHFR mutation C677T/L7471G     gHTN (new dx) 2023    Herpes simplex virus (HSV) infection     Hx of blood clots     ovarian    Thrombosis of ovarian vein     after 2nd birth                                                                    Past Surgical History:   Procedure Laterality Date    CHOLECYSTECTOMY      TUBAL LIGATION       Family History   Problem Relation Age of Onset    Diabetes Mother     Ovarian Cancer Mother     Diabetes Father     Diabetes Sister     Diabetes Maternal Grandmother      Social History     Tobacco Use   Smoking Status Every Day    Current packs/day: 0.00    Average packs/day: 1 pack/day for 16.9 years (16.9 ttl pk-yrs)    Types: Cigarettes    Start date: 2006    Last attempt to quit: 2023    Years

## 2025-04-28 NOTE — PROGRESS NOTES
Patient was in the office today for a BRCA.    Draw per physician order using sterile technique.  Drawn from the right AC.    [employer] pickup scheduled for BRCA specimen. [id number].

## 2025-04-29 ENCOUNTER — RESULTS FOLLOW-UP (OUTPATIENT)
Dept: OBGYN CLINIC | Age: 35
End: 2025-04-29

## 2025-04-29 LAB
HPV I/H RISK 4 DNA CVX QL NAA+PROBE: NOT DETECTED
HPV SAMPLE: NORMAL
HPV, INTERPRETATION: NORMAL
HPV16 DNA CVX QL NAA+PROBE: NOT DETECTED
HPV18 DNA CVX QL NAA+PROBE: NOT DETECTED
SPECIMEN DESCRIPTION: NORMAL

## 2025-05-06 LAB — CYTOLOGY REPORT: NORMAL

## 2025-05-29 ENCOUNTER — LAB (OUTPATIENT)
Dept: LAB | Age: 35
End: 2025-05-29

## 2025-05-29 ENCOUNTER — OFFICE VISIT (OUTPATIENT)
Age: 35
End: 2025-05-29
Payer: COMMERCIAL

## 2025-05-29 VITALS
SYSTOLIC BLOOD PRESSURE: 129 MMHG | WEIGHT: 150 LBS | DIASTOLIC BLOOD PRESSURE: 83 MMHG | HEART RATE: 102 BPM | OXYGEN SATURATION: 98 % | HEIGHT: 61 IN | TEMPERATURE: 98.6 F | BODY MASS INDEX: 28.32 KG/M2

## 2025-05-29 DIAGNOSIS — Z80.41 FAMILY HISTORY OF OVARIAN CANCER: Primary | ICD-10-CM

## 2025-05-29 DIAGNOSIS — L30.9 DERMATITIS, UNSPECIFIED: Primary | ICD-10-CM

## 2025-05-29 DIAGNOSIS — L71.9 ROSACEA: ICD-10-CM

## 2025-05-29 PROCEDURE — 11104 PUNCH BX SKIN SINGLE LESION: CPT | Performed by: PHYSICIAN ASSISTANT

## 2025-05-29 PROCEDURE — 99203 OFFICE O/P NEW LOW 30 MIN: CPT | Performed by: PHYSICIAN ASSISTANT

## 2025-05-29 NOTE — PROGRESS NOTES
BRCA test cancelled due to insurance issue. Pt declined referral for [facility]'s genetic team. Pt states \"my mom received her test back and its not genetic\".

## 2025-05-29 NOTE — PROGRESS NOTES
Dermatology Patient Note  [facility]  [facility] DERMATOLOGY  [address]  [place] [address]  Dept: [phone]  Dept Fax: [phone]      VISITDATE: 2025   REFERRING PROVIDER: Lyubov Jiménez *      Lizjan Barr is a 34 y.o. female  who presents today in the office for:    New Patient (Patient presents in the office today as a new patient for a rash on her inner left ankle. This was noticed April of this year. She states no pain or itching with this. Started out very small and is now somewhat bigger. There were also blisters initially. Primary care gave her triamcinolone 0.05% and she put abreva on. This has dried this spot up )      HISTORY OF PRESENT ILLNESS:  As above.  Pt notes very little pruritus to this area, and only when it was \"drying up\".    MEDICAL PROBLEMS:  Patient Active Problem List    Diagnosis Date Noted    MTHFR mutation 2023     Priority: High    Hx of deep venous thrombosis 2023     Priority: High    Anxiety associated with depression 2022     Priority: Medium    Myofascial muscle pain 2022     Priority: Medium    ADHD (attention deficit hyperactivity disorder), combined type 2022     Priority: Medium    Adult BMI 27.0-27.9 kg/sq m 2022     Priority: Medium    Chronic nonintractable headache 2022     Priority: Medium    gHTN (new dx) 2023     Diagnosed during labor     PreE labs wnl, P/C 0.10 (on admission)       23 M Apg 6/9 Wt 6#6 2023    39 weeks gestation of pregnancy 2023       CURRENT MEDICATIONS:   Current Outpatient Medications   Medication Sig Dispense Refill    metroNIDAZOLE (METROCREAM) 0.75 % cream Apply a pea sized amount, to \"T-zone\" of face, 2 times daily. 45 g 3    butalbital-acetaminophen-caffeine (FIORICET, ESGIC) -40 MG per tablet Take 1 tablet by mouth in the morning, at noon, and at bedtime 90 tablet 0    vitamin D (ERGOCALCIFEROL) 1.25

## 2025-06-04 ENCOUNTER — RESULTS FOLLOW-UP (OUTPATIENT)
Age: 35
End: 2025-06-04

## 2025-06-04 DIAGNOSIS — L24.9 IRRITANT DERMATITIS: Primary | ICD-10-CM

## 2025-06-04 RX ORDER — FLUOCINONIDE 0.5 MG/G
OINTMENT TOPICAL
Qty: 60 G | Refills: 0 | Status: SHIPPED | OUTPATIENT
Start: 2025-06-04

## 2025-06-05 ENCOUNTER — CLINICAL SUPPORT (OUTPATIENT)
Age: 35
End: 2025-06-05

## 2025-06-05 DIAGNOSIS — Z48.02 VISIT FOR SUTURE REMOVAL: Primary | ICD-10-CM

## 2025-06-05 PROCEDURE — 99024 POSTOP FOLLOW-UP VISIT: CPT | Performed by: PHYSICIAN ASSISTANT
